# Patient Record
Sex: FEMALE | HISPANIC OR LATINO | Employment: FULL TIME | URBAN - METROPOLITAN AREA
[De-identification: names, ages, dates, MRNs, and addresses within clinical notes are randomized per-mention and may not be internally consistent; named-entity substitution may affect disease eponyms.]

---
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
## 2021-01-26 ENCOUNTER — HOSPITAL ENCOUNTER (INPATIENT)
Facility: MEDICAL CENTER | Age: 21
LOS: 2 days | DRG: 091 | End: 2021-01-28
Attending: EMERGENCY MEDICINE | Admitting: HOSPITALIST
Payer: COMMERCIAL

## 2021-01-26 PROBLEM — T50.902A INTENTIONAL DRUG OVERDOSE (HCC): Status: ACTIVE | Noted: 2021-01-26

## 2021-01-26 PROBLEM — R65.10 SIRS (SYSTEMIC INFLAMMATORY RESPONSE SYNDROME) (HCC): Status: ACTIVE | Noted: 2021-01-26

## 2021-01-26 PROBLEM — R94.31 QT PROLONGATION: Status: ACTIVE | Noted: 2021-01-26

## 2021-01-26 PROBLEM — E87.6 HYPOKALEMIA: Status: ACTIVE | Noted: 2021-01-26

## 2021-01-26 PROBLEM — G93.40 ENCEPHALOPATHY ACUTE: Status: ACTIVE | Noted: 2021-01-26

## 2021-01-26 LAB
ALBUMIN SERPL BCP-MCNC: 4.3 G/DL (ref 3.2–4.9)
ALBUMIN/GLOB SERPL: 1.6 G/DL
ALP SERPL-CCNC: 88 U/L (ref 30–99)
ALT SERPL-CCNC: 12 U/L (ref 2–50)
AMPHET UR QL SCN: NEGATIVE
ANION GAP SERPL CALC-SCNC: 19 MMOL/L (ref 7–16)
APAP SERPL-MCNC: <5 UG/ML (ref 10–30)
APPEARANCE UR: ABNORMAL
AST SERPL-CCNC: 19 U/L (ref 12–45)
BACTERIA #/AREA URNS HPF: NEGATIVE /HPF
BARBITURATES UR QL SCN: NEGATIVE
BASOPHILS # BLD AUTO: 0.3 % (ref 0–1.8)
BASOPHILS # BLD: 0.06 K/UL (ref 0–0.12)
BENZODIAZ UR QL SCN: POSITIVE
BILIRUB SERPL-MCNC: 0.2 MG/DL (ref 0.1–1.5)
BILIRUB UR QL STRIP.AUTO: NEGATIVE
BUN SERPL-MCNC: 10 MG/DL (ref 8–22)
BZE UR QL SCN: NEGATIVE
CALCIUM SERPL-MCNC: 8.9 MG/DL (ref 8.5–10.5)
CANNABINOIDS UR QL SCN: NEGATIVE
CHLORIDE SERPL-SCNC: 101 MMOL/L (ref 96–112)
CK SERPL-CCNC: 980 U/L (ref 0–154)
CO2 SERPL-SCNC: 17 MMOL/L (ref 20–33)
COLOR UR: YELLOW
CREAT SERPL-MCNC: 0.95 MG/DL (ref 0.5–1.4)
EKG IMPRESSION: NORMAL
EKG IMPRESSION: NORMAL
EOSINOPHIL # BLD AUTO: 0.01 K/UL (ref 0–0.51)
EOSINOPHIL NFR BLD: 0.1 % (ref 0–6.9)
EPI CELLS #/AREA URNS HPF: NEGATIVE /HPF
ERYTHROCYTE [DISTWIDTH] IN BLOOD BY AUTOMATED COUNT: 42.5 FL (ref 35.9–50)
ETHANOL BLD-MCNC: <10.1 MG/DL (ref 0–10)
GLOBULIN SER CALC-MCNC: 2.7 G/DL (ref 1.9–3.5)
GLUCOSE SERPL-MCNC: 182 MG/DL (ref 65–99)
GLUCOSE UR STRIP.AUTO-MCNC: NEGATIVE MG/DL
HCG SERPL QL: NEGATIVE
HCT VFR BLD AUTO: 39.7 % (ref 37–47)
HGB BLD-MCNC: 13.1 G/DL (ref 12–16)
HYALINE CASTS #/AREA URNS LPF: NORMAL /LPF
IMM GRANULOCYTES # BLD AUTO: 0.17 K/UL (ref 0–0.11)
IMM GRANULOCYTES NFR BLD AUTO: 0.9 % (ref 0–0.9)
KETONES UR STRIP.AUTO-MCNC: ABNORMAL MG/DL
LEUKOCYTE ESTERASE UR QL STRIP.AUTO: NEGATIVE
LYMPHOCYTES # BLD AUTO: 1.14 K/UL (ref 1–4.8)
LYMPHOCYTES NFR BLD: 5.8 % (ref 22–41)
MAGNESIUM SERPL-MCNC: 1.8 MG/DL (ref 1.5–2.5)
MCH RBC QN AUTO: 30.2 PG (ref 27–33)
MCHC RBC AUTO-ENTMCNC: 33 G/DL (ref 33.6–35)
MCV RBC AUTO: 91.5 FL (ref 81.4–97.8)
METHADONE UR QL SCN: NEGATIVE
MICRO URNS: ABNORMAL
MONOCYTES # BLD AUTO: 0.71 K/UL (ref 0–0.85)
MONOCYTES NFR BLD AUTO: 3.6 % (ref 0–13.4)
NEUTROPHILS # BLD AUTO: 17.59 K/UL (ref 2–7.15)
NEUTROPHILS NFR BLD: 89.3 % (ref 44–72)
NITRITE UR QL STRIP.AUTO: NEGATIVE
NRBC # BLD AUTO: 0 K/UL
NRBC BLD-RTO: 0 /100 WBC
OPIATES UR QL SCN: NEGATIVE
OXYCODONE UR QL SCN: NEGATIVE
PCP UR QL SCN: NEGATIVE
PH UR STRIP.AUTO: 5 [PH] (ref 5–8)
PLATELET # BLD AUTO: 217 K/UL (ref 164–446)
PMV BLD AUTO: 10.6 FL (ref 9–12.9)
POTASSIUM SERPL-SCNC: 3.1 MMOL/L (ref 3.6–5.5)
POTASSIUM SERPL-SCNC: 3.2 MMOL/L (ref 3.6–5.5)
PROPOXYPH UR QL SCN: NEGATIVE
PROT SERPL-MCNC: 7 G/DL (ref 6–8.2)
PROT UR QL STRIP: NEGATIVE MG/DL
RBC # BLD AUTO: 4.34 M/UL (ref 4.2–5.4)
RBC # URNS HPF: NORMAL /HPF
RBC UR QL AUTO: NEGATIVE
SALICYLATES SERPL-MCNC: <1 MG/DL (ref 15–25)
SARS-COV-2 RNA RESP QL NAA+PROBE: NOTDETECTED
SODIUM SERPL-SCNC: 137 MMOL/L (ref 135–145)
SP GR UR STRIP.AUTO: 1.02
SPECIMEN SOURCE: NORMAL
UROBILINOGEN UR STRIP.AUTO-MCNC: 0.2 MG/DL
WBC # BLD AUTO: 19.7 K/UL (ref 4.8–10.8)
WBC #/AREA URNS HPF: NORMAL /HPF

## 2021-01-26 PROCEDURE — 93005 ELECTROCARDIOGRAM TRACING: CPT | Performed by: EMERGENCY MEDICINE

## 2021-01-26 PROCEDURE — 99223 1ST HOSP IP/OBS HIGH 75: CPT | Performed by: PSYCHIATRY & NEUROLOGY

## 2021-01-26 PROCEDURE — 80179 DRUG ASSAY SALICYLATE: CPT

## 2021-01-26 PROCEDURE — 770022 HCHG ROOM/CARE - ICU (200)

## 2021-01-26 PROCEDURE — 700105 HCHG RX REV CODE 258: Performed by: EMERGENCY MEDICINE

## 2021-01-26 PROCEDURE — 84132 ASSAY OF SERUM POTASSIUM: CPT

## 2021-01-26 PROCEDURE — 700111 HCHG RX REV CODE 636 W/ 250 OVERRIDE (IP): Performed by: HOSPITALIST

## 2021-01-26 PROCEDURE — U0005 INFEC AGEN DETEC AMPLI PROBE: HCPCS

## 2021-01-26 PROCEDURE — 94760 N-INVAS EAR/PLS OXIMETRY 1: CPT

## 2021-01-26 PROCEDURE — 99291 CRITICAL CARE FIRST HOUR: CPT

## 2021-01-26 PROCEDURE — 93005 ELECTROCARDIOGRAM TRACING: CPT | Performed by: HOSPITALIST

## 2021-01-26 PROCEDURE — 93005 ELECTROCARDIOGRAM TRACING: CPT

## 2021-01-26 PROCEDURE — U0003 INFECTIOUS AGENT DETECTION BY NUCLEIC ACID (DNA OR RNA); SEVERE ACUTE RESPIRATORY SYNDROME CORONAVIRUS 2 (SARS-COV-2) (CORONAVIRUS DISEASE [COVID-19]), AMPLIFIED PROBE TECHNIQUE, MAKING USE OF HIGH THROUGHPUT TECHNOLOGIES AS DESCRIBED BY CMS-2020-01-R: HCPCS

## 2021-01-26 PROCEDURE — 81001 URINALYSIS AUTO W/SCOPE: CPT

## 2021-01-26 PROCEDURE — 82077 ASSAY SPEC XCP UR&BREATH IA: CPT

## 2021-01-26 PROCEDURE — 84703 CHORIONIC GONADOTROPIN ASSAY: CPT

## 2021-01-26 PROCEDURE — 99291 CRITICAL CARE FIRST HOUR: CPT | Performed by: INTERNAL MEDICINE

## 2021-01-26 PROCEDURE — 36415 COLL VENOUS BLD VENIPUNCTURE: CPT

## 2021-01-26 PROCEDURE — 700105 HCHG RX REV CODE 258: Performed by: HOSPITALIST

## 2021-01-26 PROCEDURE — 700111 HCHG RX REV CODE 636 W/ 250 OVERRIDE (IP): Performed by: PSYCHIATRY & NEUROLOGY

## 2021-01-26 PROCEDURE — 85025 COMPLETE CBC W/AUTO DIFF WBC: CPT

## 2021-01-26 PROCEDURE — 83735 ASSAY OF MAGNESIUM: CPT

## 2021-01-26 PROCEDURE — 80143 DRUG ASSAY ACETAMINOPHEN: CPT

## 2021-01-26 PROCEDURE — 700101 HCHG RX REV CODE 250: Performed by: HOSPITALIST

## 2021-01-26 PROCEDURE — 80307 DRUG TEST PRSMV CHEM ANLYZR: CPT

## 2021-01-26 PROCEDURE — 80053 COMPREHEN METABOLIC PANEL: CPT

## 2021-01-26 PROCEDURE — 82550 ASSAY OF CK (CPK): CPT

## 2021-01-26 PROCEDURE — 93010 ELECTROCARDIOGRAM REPORT: CPT | Performed by: INTERNAL MEDICINE

## 2021-01-26 RX ORDER — FLUOXETINE HYDROCHLORIDE 20 MG/1
40 CAPSULE ORAL DAILY
Status: ON HOLD | COMMUNITY
End: 2021-01-28

## 2021-01-26 RX ORDER — PROCHLORPERAZINE EDISYLATE 5 MG/ML
10 INJECTION INTRAMUSCULAR; INTRAVENOUS EVERY 6 HOURS PRN
Status: DISCONTINUED | OUTPATIENT
Start: 2021-01-26 | End: 2021-01-26

## 2021-01-26 RX ORDER — PROCHLORPERAZINE EDISYLATE 5 MG/ML
10 INJECTION INTRAMUSCULAR; INTRAVENOUS EVERY 6 HOURS PRN
Status: DISCONTINUED | OUTPATIENT
Start: 2021-01-26 | End: 2021-01-28 | Stop reason: HOSPADM

## 2021-01-26 RX ORDER — SODIUM CHLORIDE 9 MG/ML
1000 INJECTION, SOLUTION INTRAVENOUS ONCE
Status: COMPLETED | OUTPATIENT
Start: 2021-01-26 | End: 2021-01-26

## 2021-01-26 RX ORDER — POTASSIUM CHLORIDE 20 MEQ/1
40 TABLET, EXTENDED RELEASE ORAL ONCE
Status: COMPLETED | OUTPATIENT
Start: 2021-01-26 | End: 2021-01-27

## 2021-01-26 RX ORDER — DEXMEDETOMIDINE HYDROCHLORIDE 4 UG/ML
.1-1.5 INJECTION, SOLUTION INTRAVENOUS CONTINUOUS
Status: DISCONTINUED | OUTPATIENT
Start: 2021-01-26 | End: 2021-01-27

## 2021-01-26 RX ORDER — LORAZEPAM 2 MG/ML
1-2 INJECTION INTRAMUSCULAR
Status: DISCONTINUED | OUTPATIENT
Start: 2021-01-26 | End: 2021-01-28 | Stop reason: HOSPADM

## 2021-01-26 RX ORDER — AMOXICILLIN 250 MG
2 CAPSULE ORAL 2 TIMES DAILY
Status: DISCONTINUED | OUTPATIENT
Start: 2021-01-26 | End: 2021-01-28 | Stop reason: HOSPADM

## 2021-01-26 RX ORDER — BISACODYL 10 MG
10 SUPPOSITORY, RECTAL RECTAL
Status: DISCONTINUED | OUTPATIENT
Start: 2021-01-26 | End: 2021-01-28 | Stop reason: HOSPADM

## 2021-01-26 RX ORDER — POLYETHYLENE GLYCOL 3350 17 G/17G
1 POWDER, FOR SOLUTION ORAL
Status: DISCONTINUED | OUTPATIENT
Start: 2021-01-26 | End: 2021-01-28 | Stop reason: HOSPADM

## 2021-01-26 RX ORDER — LISDEXAMFETAMINE DIMESYLATE 30 MG/1
30 CAPSULE ORAL EVERY MORNING
Status: ON HOLD | COMMUNITY
End: 2021-01-28

## 2021-01-26 RX ADMIN — POTASSIUM CHLORIDE: 149 INJECTION, SOLUTION, CONCENTRATE INTRAVENOUS at 23:03

## 2021-01-26 RX ADMIN — DEXMEDETOMIDINE HYDROCHLORIDE 0.2 MCG/KG/HR: 100 INJECTION, SOLUTION INTRAVENOUS at 16:05

## 2021-01-26 RX ADMIN — SODIUM CHLORIDE 1000 ML: 9 INJECTION, SOLUTION INTRAVENOUS at 11:14

## 2021-01-26 RX ADMIN — POTASSIUM CHLORIDE: 149 INJECTION, SOLUTION, CONCENTRATE INTRAVENOUS at 16:13

## 2021-01-26 RX ADMIN — PROCHLORPERAZINE EDISYLATE 10 MG: 5 INJECTION INTRAMUSCULAR; INTRAVENOUS at 22:59

## 2021-01-26 ASSESSMENT — COGNITIVE AND FUNCTIONAL STATUS - GENERAL
TOILETING: TOTAL
PERSONAL GROOMING: TOTAL
MOVING FROM LYING ON BACK TO SITTING ON SIDE OF FLAT BED: UNABLE
MOBILITY SCORE: 9
DRESSING REGULAR UPPER BODY CLOTHING: TOTAL
STANDING UP FROM CHAIR USING ARMS: TOTAL
SUGGESTED CMS G CODE MODIFIER DAILY ACTIVITY: CN
EATING MEALS: TOTAL
WALKING IN HOSPITAL ROOM: TOTAL
SUGGESTED CMS G CODE MODIFIER MOBILITY: CM
HELP NEEDED FOR BATHING: TOTAL
DAILY ACTIVITIY SCORE: 6
CLIMB 3 TO 5 STEPS WITH RAILING: TOTAL
MOVING TO AND FROM BED TO CHAIR: UNABLE
DRESSING REGULAR LOWER BODY CLOTHING: TOTAL

## 2021-01-26 ASSESSMENT — LIFESTYLE VARIABLES
DOES PATIENT WANT TO STOP DRINKING: CANNOT ASSESS
REASON UNABLE TO ASSESS: AMS

## 2021-01-26 ASSESSMENT — FIBROSIS 4 INDEX: FIB4 SCORE: 0.48

## 2021-01-26 NOTE — DISCHARGE PLANNING
Pt mother arrived to ED to see Pt.   Pt currently being transferred to [facility] ICU  SW updated mother about Pt Legal Hold status and no visitors are allowed until Physician or Psychiatry allow Pt to have visitors.     SW asked admitting RN transferring Pt to Unit to please contact the family with a medical update.

## 2021-01-26 NOTE — ED NOTES
Poison control case #[id number], Lanie pharmacist: Consider medical clearance after 4 hours if labs within normal limits.    Estimated overdose on Seroquel 800 mg, fluoxetine 350 mg.    Recommend following labs:  CMP  CBC  Urine drug screen   HCG  Tylenol  Salyctalyte    Patient arousable.  Blood sent to lab.
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
## 2021-01-26 NOTE — ASSESSMENT & PLAN NOTE
Purposeful overdose on Quetiapine and Fluoxetine with contribution from recent life stressors  Legal hold placed and extended  Psychiatry onboard, will re-evaluate  Medically cleared for transfer to psychiatric facility  Continue 1:1 sitter  Hold psychiatric medications
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
## 2021-01-26 NOTE — ED NOTES
Patient high risk for suicide screening, patient in direct observation of sitter at all times.  Necessary medical equipment remains at patient bedside.  Patient confused and pulling at lines, bilateral wrist restraints applied, ERP made aware.      Patient alert to verbal stimuli - QUINN, opens eyes, not following commands at this time.

## 2021-01-26 NOTE — ED NOTES
Bedside report to ALEXA Dawn.  Phylicia Sammy transported to [facility] via gurney with RN and tech, patient on monitor. All personal belongings in possession.  NAD noted.

## 2021-01-26 NOTE — ASSESSMENT & PLAN NOTE
SIRS criteria identified on my evaluation include:  Tachycardia, with heart rate greater than 90 BPM  SIRS is non-infectious, the patient does not have sepsis  WBC is 19 and she has tachycardia  Resolved

## 2021-01-26 NOTE — ED TRIAGE NOTES
Chief Complaint   Patient presents with   • Suicidal Ideation   • Drug Overdose     estimated seroquel 800 mg and fluoxetine 350 mg   • Laceration     bilateral wrist - superficial wounds, no bleeding.    • ALOC     Patient bib EMS from home.  Reported SI attempted.  Per EMS, patient texted friend she was going to overdose on home medications at 0600.  Patient LKW last night.  Patient combative on scene, PTA:  PIV placed    Versed 4 mg administered   ml  Per EMS, patient had 1 episode of emesis on scene, no pills noted.      Per EMS, estimated Seroquel 800 mg and fluoxetine 350 mg estimated on overdose if patient taking prescription as prescribed.      Patient combative during move to [facility], puling at lines, does not answer questions.  Medical restraints applied. Respirations even and unlabored.    Chart up for ERP.

## 2021-01-26 NOTE — DISCHARGE PLANNING
Alert Team  Consult order noted for SA.  Legal sobriety needs to be confirmed first.  RN to have PAR complete pt's registration.  Push fluids til able to supply sample for UDS.  WCTM.

## 2021-01-26 NOTE — ED PROVIDER NOTES
ED Provider Note    Scribed for Jonathan Schwartz M.D. by Kasey Dinero. 1/26/2021, 10:21 AM.    Primary care provider: Pcp Pt States None  Means of arrival: EMS  History obtained from: Nurse   History limited by: ALOC    CHIEF COMPLAINT  Chief Complaint   Patient presents with   • Suicidal Ideation   • Drug Overdose     estimated seroquel 800 mg and fluoxetine 350 mg   • Laceration     bilateral wrist - superficial wounds, no bleeding.    • ALOC       HPI  Phylicia Christianson is a 20 y.o. female who presents to the Emergency Department via EMS for a drug overdose at around 6 AM. Per the nurse, the patient texted her friend that she was going to overdose on her home medications; she took 16 tabs of seroquel (800 mg) and 350 mg fluoxeteine. When she was found in her room by her brother, she had superficial wounds to both wrists. EMS states that the patient had 1 episode of vomiting when they arrived with no pills visualize in the vomit. No other exacerbating or alleviating factors were noted.  In route, she was given 4 mg Versed and  mL.     HPI limited secondary to the patient's ALOC    REVIEW OF SYSTEMS  Pertinent positives include drug overdose, suicidal ideation, lacerations to both wrist, ALOC.    ROS limited secondary to the patient's ALOC    PAST MEDICAL HISTORY   has a past medical history of ADD (attention deficit disorder), Anxiety, ASTHMA, Depression, and Psychiatric disorder.    SURGICAL HISTORY  patient denies any surgical history    SOCIAL HISTORY  Social History     Tobacco Use   • Smoking status: Never Smoker   • Smokeless tobacco: Never Used   Substance Use Topics   • Alcohol use: No   • Drug use: No      Social History     Substance and Sexual Activity   Drug Use No       FAMILY HISTORY  None pertinent    CURRENT MEDICATIONS  Current Outpatient Medications   Medication Instructions   • albuterol 108 (90 Base) MCG/ACT Aero Soln inhalation aerosol 2 Puffs, Inhalation, EVERY 6 HOURS PRN   • BUSPIRONE HCL  "PO Oral   • lamoTRIgine (LAMICTAL) 100 mg, Oral, DAILY   • methylphenidate (RITALIN) 10 mg, Oral, 2 TIMES DAILY   • QUEtiapine (SEROQUEL) 50 mg, Oral, 2 TIMES DAILY     ALLERGIES  Allergies   Allergen Reactions   • Cat Hair Extract        PHYSICAL EXAM  VITAL SIGNS: /65   Pulse (!) 106   Temp 36.6 °C (97.8 °F) (Temporal)   Resp (!) 22   Ht 1.6 m (5' 3\")   Wt 72.6 kg (160 lb)   SpO2 97%   BMI 28.34 kg/m²     Constitutional: Well developed, Well nourished, No acute distress, Non-toxic appearance.   HENT: Normocephalic, Atraumatic, mucous membranes moist, no erythema, exudates, swelling, or masses, nares patent  Eyes: nonicteric  Neck: Supple, no meningismus  Lymphatic: No lymphadenopathy noted.   Cardiovascular: Regular rate and rhythm, no gallops rubs or murmurs  Lungs: Clear bilaterally   Abdomen: Bowel sounds normal, Soft, No tenderness  Skin: Warm, Dry, no rash  Genitalia: Deferred  Rectal: Deferred  Extremities: No edema, superficial abrasions to wrists bilaterally  Neurologic: Moving all extremities, no evidence of head trauma, responds to painful stimuli, protecting air way, disconjugate gaze. GCS 9  Psychiatric: Suicidal ideation noted. Does not answer questions appropriately.     DIAGNOSTIC STUDIES / PROCEDURES    LABS  Results for orders placed or performed during the hospital encounter of 01/26/21   CBC WITH DIFFERENTIAL   Result Value Ref Range    WBC 19.7 (H) 4.8 - 10.8 K/uL    RBC 4.34 4.20 - 5.40 M/uL    Hemoglobin 13.1 12.0 - 16.0 g/dL    Hematocrit 39.7 37.0 - 47.0 %    MCV 91.5 81.4 - 97.8 fL    MCH 30.2 27.0 - 33.0 pg    MCHC 33.0 (L) 33.6 - 35.0 g/dL    RDW 42.5 35.9 - 50.0 fL    Platelet Count 217 164 - 446 K/uL    MPV 10.6 9.0 - 12.9 fL    Neutrophils-Polys 89.30 (H) 44.00 - 72.00 %    Lymphocytes 5.80 (L) 22.00 - 41.00 %    Monocytes 3.60 0.00 - 13.40 %    Eosinophils 0.10 0.00 - 6.90 %    Basophils 0.30 0.00 - 1.80 %    Immature Granulocytes 0.90 0.00 - 0.90 %    Nucleated RBC 0.00 " /100 WBC    Neutrophils (Absolute) 17.59 (H) 2.00 - 7.15 K/uL    Lymphs (Absolute) 1.14 1.00 - 4.80 K/uL    Monos (Absolute) 0.71 0.00 - 0.85 K/uL    Eos (Absolute) 0.01 0.00 - 0.51 K/uL    Baso (Absolute) 0.06 0.00 - 0.12 K/uL    Immature Granulocytes (abs) 0.17 (H) 0.00 - 0.11 K/uL    NRBC (Absolute) 0.00 K/uL   Comp Metabolic Panel   Result Value Ref Range    Sodium 137 135 - 145 mmol/L    Potassium 3.1 (L) 3.6 - 5.5 mmol/L    Chloride 101 96 - 112 mmol/L    Co2 17 (L) 20 - 33 mmol/L    Anion Gap 19.0 (H) 7.0 - 16.0    Glucose 182 (H) 65 - 99 mg/dL    Bun 10 8 - 22 mg/dL    Creatinine 0.95 0.50 - 1.40 mg/dL    Calcium 8.9 8.5 - 10.5 mg/dL    AST(SGOT) 19 12 - 45 U/L    ALT(SGPT) 12 2 - 50 U/L    Alkaline Phosphatase 88 30 - 99 U/L    Total Bilirubin 0.2 0.1 - 1.5 mg/dL    Albumin 4.3 3.2 - 4.9 g/dL    Total Protein 7.0 6.0 - 8.2 g/dL    Globulin 2.7 1.9 - 3.5 g/dL    A-G Ratio 1.6 g/dL   HCG QUAL SERUM   Result Value Ref Range    Beta-Hcg Qualitative Serum Negative Negative   ESTIMATED GFR   Result Value Ref Range    GFR If African American >60 >60 mL/min/1.73 m 2    GFR If Non African American >60 >60 mL/min/1.73 m 2   SARS-CoV-2 PCR (24 hour In-House): Collect NP swab in [facility]    Specimen: Respirate   Result Value Ref Range    SARS-CoV-2 Source NP Swab     SARS-CoV-2 by PCR NotDetected    ACETAMINOPHEN   Result Value Ref Range    Acetaminophen -Tylenol <5 (L) 10 - 30 ug/mL   Salicylate   Result Value Ref Range    Salicylates, Quant. <1 (L) 15 - 25 mg/dL   URINALYSIS (UA)    Specimen: Urine   Result Value Ref Range    Color Yellow     Character Cloudy (A)     Specific Gravity 1.022 <1.035    Ph 5.0 5.0 - 8.0    Glucose Negative Negative mg/dL    Ketones Trace (A) Negative mg/dL    Protein Negative Negative mg/dL    Bilirubin Negative Negative    Urobilinogen, Urine 0.2 Negative    Nitrite Negative Negative    Leukocyte Esterase Negative Negative    Occult Blood Negative Negative    Micro Urine Req Microscopic     DIAGNOSTIC ALCOHOL   Result Value Ref Range    Diagnostic Alcohol <10.1 0.0 - 10.0 mg/dL   URINE DRUG SCREEN   Result Value Ref Range    Amphetamines Urine Negative Negative    Barbiturates Negative Negative    Benzodiazepines Positive (A) Negative    Cocaine Metabolite Negative Negative    Methadone Negative Negative    Opiates Negative Negative    Oxycodone Negative Negative    Phencyclidine -Pcp Negative Negative    Propoxyphene Negative Negative    Cannabinoid Metab Negative Negative   MAGNESIUM   Result Value Ref Range    Magnesium 1.8 1.5 - 2.5 mg/dL   URINE MICROSCOPIC (W/UA)   Result Value Ref Range    WBC 0-2 /hpf    RBC 0-2 /hpf    Bacteria Negative None /hpf    Epithelial Cells Negative /hpf    Hyaline Cast 0-2 /lpf   CREATINE KINASE   Result Value Ref Range    CPK Total 980 (H) 0 - 154 U/L   POTASSIUM SERUM (K)   Result Value Ref Range    Potassium 3.2 (L) 3.6 - 5.5 mmol/L   CBC WITH DIFFERENTIAL   Result Value Ref Range    WBC 9.5 4.8 - 10.8 K/uL    RBC 3.07 (L) 4.20 - 5.40 M/uL    Hemoglobin 9.3 (L) 12.0 - 16.0 g/dL    Hematocrit 29.1 (L) 37.0 - 47.0 %    MCV 93.2 81.4 - 97.8 fL    MCH 30.3 27.0 - 33.0 pg    MCHC 32.5 (L) 33.6 - 35.0 g/dL    RDW 44.9 35.9 - 50.0 fL    Platelet Count 183 164 - 446 K/uL    MPV 10.3 9.0 - 12.9 fL    Neutrophils-Polys 77.70 (H) 44.00 - 72.00 %    Lymphocytes 17.80 (L) 22.00 - 41.00 %    Monocytes 4.00 0.00 - 13.40 %    Eosinophils 0.00 0.00 - 6.90 %    Basophils 0.20 0.00 - 1.80 %    Immature Granulocytes 0.30 0.00 - 0.90 %    Nucleated RBC 0.00 /100 WBC    Neutrophils (Absolute) 7.36 (H) 2.00 - 7.15 K/uL    Lymphs (Absolute) 1.69 1.00 - 4.80 K/uL    Monos (Absolute) 0.38 0.00 - 0.85 K/uL    Eos (Absolute) 0.00 0.00 - 0.51 K/uL    Baso (Absolute) 0.02 0.00 - 0.12 K/uL    Immature Granulocytes (abs) 0.03 0.00 - 0.11 K/uL    NRBC (Absolute) 0.00 K/uL   Comp Metabolic Panel   Result Value Ref Range    Sodium 137 135 - 145 mmol/L    Potassium 4.1 3.6 - 5.5 mmol/L    Chloride  104 96 - 112 mmol/L    Co2 24 20 - 33 mmol/L    Anion Gap 9.0 7.0 - 16.0    Glucose 87 65 - 99 mg/dL    Bun 5 (L) 8 - 22 mg/dL    Creatinine 0.71 0.50 - 1.40 mg/dL    Calcium 9.3 8.5 - 10.5 mg/dL    AST(SGOT) 40 12 - 45 U/L    ALT(SGPT) 16 2 - 50 U/L    Alkaline Phosphatase 95 30 - 99 U/L    Total Bilirubin 0.4 0.1 - 1.5 mg/dL    Albumin 4.2 3.2 - 4.9 g/dL    Total Protein 6.9 6.0 - 8.2 g/dL    Globulin 2.7 1.9 - 3.5 g/dL    A-G Ratio 1.6 g/dL   MAGNESIUM   Result Value Ref Range    Magnesium 2.0 1.5 - 2.5 mg/dL   PHOSPHORUS   Result Value Ref Range    Phosphorus 3.0 2.5 - 4.5 mg/dL   ESTIMATED GFR   Result Value Ref Range    GFR If African American >60 >60 mL/min/1.73 m 2    GFR If Non African American >60 >60 mL/min/1.73 m 2   CBC WITH DIFFERENTIAL   Result Value Ref Range    WBC 6.1 4.8 - 10.8 K/uL    RBC 4.85 4.20 - 5.40 M/uL    Hemoglobin 14.5 12.0 - 16.0 g/dL    Hematocrit 44.0 37.0 - 47.0 %    MCV 90.7 81.4 - 97.8 fL    MCH 29.9 27.0 - 33.0 pg    MCHC 33.0 (L) 33.6 - 35.0 g/dL    RDW 42.9 35.9 - 50.0 fL    Platelet Count 274 164 - 446 K/uL    MPV 10.1 9.0 - 12.9 fL    Neutrophils-Polys 57.50 44.00 - 72.00 %    Lymphocytes 35.30 22.00 - 41.00 %    Monocytes 5.40 0.00 - 13.40 %    Eosinophils 1.20 0.00 - 6.90 %    Basophils 0.30 0.00 - 1.80 %    Immature Granulocytes 0.30 0.00 - 0.90 %    Nucleated RBC 0.00 /100 WBC    Neutrophils (Absolute) 3.48 2.00 - 7.15 K/uL    Lymphs (Absolute) 2.14 1.00 - 4.80 K/uL    Monos (Absolute) 0.33 0.00 - 0.85 K/uL    Eos (Absolute) 0.07 0.00 - 0.51 K/uL    Baso (Absolute) 0.02 0.00 - 0.12 K/uL    Immature Granulocytes (abs) 0.02 0.00 - 0.11 K/uL    NRBC (Absolute) 0.00 K/uL   Comp Metabolic Panel   Result Value Ref Range    Sodium 136 135 - 145 mmol/L    Potassium 3.9 3.6 - 5.5 mmol/L    Chloride 102 96 - 112 mmol/L    Co2 24 20 - 33 mmol/L    Anion Gap 10.0 7.0 - 16.0    Glucose 82 65 - 99 mg/dL    Bun 9 8 - 22 mg/dL    Creatinine 0.80 0.50 - 1.40 mg/dL    Calcium 9.8 8.5 -  10.5 mg/dL    AST(SGOT) 46 (H) 12 - 45 U/L    ALT(SGPT) 22 2 - 50 U/L    Alkaline Phosphatase 99 30 - 99 U/L    Total Bilirubin 0.5 0.1 - 1.5 mg/dL    Albumin 4.6 3.2 - 4.9 g/dL    Total Protein 7.8 6.0 - 8.2 g/dL    Globulin 3.2 1.9 - 3.5 g/dL    A-G Ratio 1.4 g/dL   MAGNESIUM   Result Value Ref Range    Magnesium 2.1 1.5 - 2.5 mg/dL   PHOSPHORUS   Result Value Ref Range    Phosphorus 3.1 2.5 - 4.5 mg/dL   ESTIMATED GFR   Result Value Ref Range    GFR If African American >60 >60 mL/min/1.73 m 2    GFR If Non African American >60 >60 mL/min/1.73 m 2   EKG   Result Value Ref Range    Report       [facility] Emergency Dept.    Test Date:  2021  Pt Name:    MYCHAL EUGENE             Department: ER  MRN:        [id number]                      Room:       RD 04  Gender:     Female                       Technician: [id number]  :        2000                   Requested By:ER TRIAGE PROTOCOL  Order #:    [id number]                    Reading MD:    Measurements  Intervals                                Axis  Rate:       105                          P:          62  AR:         160                          QRS:        31  QRSD:       86                           T:          9  QT:         372  QTc:        492    Interpretive Statements  SINUS TACHYCARDIA  BORDERLINE PROLONGED QT INTERVAL  No previous ECG available for comparison     EKG (NOW)   Result Value Ref Range    Report       [facility]    Test Date:  2021  Pt Name:    MYCHAL EUGENE             Department: ER  MRN:        [id number]                      Room:       14  Gender:     Female                       Technician: PEP  :        2000                   Requested By:RUSTY BEAR  Order #:    [id number]                    Reading MD: Alonso Quiñonez MD    Measurements  Intervals                                Axis  Rate:       109                          P:          69  AR:         192                           QRS:        45  QRSD:       90                           T:          41  QT:         352  QTc:        475    Interpretive Statements  SINUS TACHYCARDIA  BASELINE WANDER IN LEAD(S) V6  Compared to ECG 2021 09:56:15  No significant changes  Electronically Signed On 2021 17:08:09 PST by Alonso Quiñonez MD     EKG   Result Value Ref Range    Report       [facility]    Test Date:  2021  Pt Name:    MYCHAL EUGENE             Department: [facility]  MRN:        [id number]                      Room:       R114  Gender:     Female                       Technician: [facility]  :        2000                   Requested By:GRICELDA ALMANZAR  Order #:    [id number]                    Reading MD: Thomas Miles MD    Measurements  Intervals                                Axis  Rate:       72                           P:          64  MS:         172                          QRS:        35  QRSD:       84                           T:          21  QT:         400  QTc:        438    Interpretive Statements  SINUS RHYTHM  Compared to ECG 2021 16:16:04  Sinus tachycardia no longer present  Electronically Signed On 2021 14:40:15 PST by Thomas Miles MD       All labs reviewed by me.    EKG  Obtained at 9:56 AM  Sinus tachycardia   Rate 105  Axis normal   Prolonged QT interval  No ST segment elevation or depression.     COURSE & MEDICAL DECISION MAKING  Nursing notes, VS, PMSFHx reviewed in chart.     PPE Note: I personally donned full PPE for all patient encounters during this visit, including wearing an N95 respirator mask, gloves, and eye protection. Scribe remained outside the patient's room and did not have any contact with the patient for the duration of patient encounter.      10:21 AM Patient seen and examined at bedside. Poison control contacted by nurse (case #[id number]); recommended CMP, CBC, urine drug screen, HCG, acetaminophen, and salicylate, with medical clearance in 4 hours if labs  are normal.  Ordered for Diagnostic alcohol, Urine drug screen, CBC w/diff, CMP, HCG qual serum, Acetaminophen, Salicylate, and an EKG to evaluate. Legal hold in place.    11:04 AM Patient will be treated with NS infusion 1000 mL    11:08 AM Recheck: Patient re-evaluated at bedside. Patient is resting comfortably    11:19 AM Patient was placed in medical restraints at this time as nurse notes that patient keeps pulling at lines.     12:37 AM Ordered a UA to evaluate the patient's symptoms further    12:54 PM Recheck: Patient re-evaluated at beside. Patient is resting comfortably.     HYDRATION: Based on the patient's presentation of Tachycardia the patient was given IV fluids. IV Hydration was used because oral hydration was not adequate alone. Upon recheck following hydration, the patient was improved.      Decision Making:  This is a 20 y.o. year old female who presents status post intentional overdose with Seroquel and fluoxetine.  The patient is altered and has remained altered throughout her stay.  She does arouse primarily to painful stimuli.  She is essentially nonverbal at this point and unable to follow commands.  Tylenol and aspirin are negative.  Her vitals have remained relatively stable.  Her EKG demonstrates no QRS widening.  Electrolytes are otherwise reassuring.  Given the patient's change in mental status she will need more prolonged observation then can be accomplished in the emergency room and she will be admitted for intentional overdose.    CRITICAL CARE  The very real possibilty of a deterioration of this patient's condition required the highest level of my preparedness for sudden, emergent intervention.  I provided critical care services, which included medication orders, frequent reevaluations of the patient's condition and response to treatment, ordering and reviewing test results, and discussing the case with various consultants.  The critical care time associated with the care of the  patient was 30 minutes. Review chart for interventions. This time is exclusive of any other billable procedures.    FINAL IMPRESSION  Intentional overdose     Kasey PALAFOX (Scribe), am scribing for, and in the presence of, Jonathan Schwartz M.D..    Electronically signed by: Kasey Dinero (Scribe), 1/26/2021    Jonathan PALAFOX M.D. personally performed the services described in this documentation, as scribed by Kasey Dinero in my presence, and it is both accurate and complete.    C    The note accurately reflects work and decisions made by me.  Jonathan Schwartz M.D.  1/29/2021  9:34 AM

## 2021-01-26 NOTE — ED NOTES
Patient had episode of emesis, suction used; 2 person assist to cleanse patient and linen change - patient combative during cleanse. Repositioned and placed on side, suction remains at bedside.

## 2021-01-26 NOTE — H&P
Hospital Medicine History & Physical Note    Date of Service  1/26/2021    Primary Care Physician  Unknown      Consultants  Critical care. I discussed with Dr. Theodore  psychiatry    Code Status  Full Code    Chief Complaint  Chief Complaint   Patient presents with   • Suicidal Ideation   • Drug Overdose     estimated seroquel 800 mg and fluoxetine 350 mg   • Laceration     bilateral wrist - superficial wounds, no bleeding.    • ALOC       History of Presenting Illness  20 y.o. female who presented 1/26/2021 with overdose.  Ms. Christianson reportedly has a past medical history depression on fluoxetine and Seroquel that was brought in by paramedics because of a possible substance overdose.  He has multiple laceration marks on her wrist that are superficial apparently family called her she texted a friend that she was going to overdose at 6:00 this morning.  Paramedics had to give her 4 mg IV Versed.  Pill bottles are at bedside and poison control has been consulted.  Is estimated that she took about 800 mg of Seroquel and about 350 mg of fluoxetine.  Her QTC is 492 she will be admitted to the intensive care unit for continuous monitoring.  Legal hold has been placed in the emergency room.    Pill bottle are at bedside. Two bottles of Seroquel 2 mg taken daily are at bedside and they are empty.  1 bottle of fluoxetine 20 mg she is to take 2 daily bedside and nearly empty.  The provider name is Elis Walton that prescribed these medications.    The SARS-CoV-2 swab is pending at this time.  Due to her severe agitation, pulling at restraints, the setting of overdose she will be admitted to the intensive care unit with critical care consultation.  Psychiatry has been consulted from the emergency room though at this point time she is not a historian and they will hopefully see her tomorrow.    Review of Systems  Review of Systems   Unable to perform ROS: Mental status change       Past Medical History   has a past medical  history of ADD (attention deficit disorder), Anxiety, ASTHMA, Depression, and Psychiatric disorder.    Surgical History  This cannot be obtained as patient is nonverbal    Family History  This cannot be obtained as patient is nonverbal    Social History  This cannot be obtained as patient is nonverbal    Allergies  Allergies   Allergen Reactions   • Cat Hair Extract        Medications  Prior to Admission Medications   Prescriptions Last Dose Informant Patient Reported? Taking?   FLUoxetine (PROZAC) 20 MG Cap UNK at [facility] Patient's Home Pharmacy Yes Yes   Sig: Take 40 mg by mouth every day.   albuterol 108 (90 Base) MCG/ACT Aero Soln inhalation aerosol NOT TAKING at NOT TAKING Patient's Home Pharmacy No No   Sig: Inhale 2 Puffs by mouth every 6 hours as needed for Shortness of Breath.   lamoTRIgine (LAMICTAL) 100 MG Tab UNK at [facility] Patient's Home Pharmacy Yes No   Sig: Take 100 mg by mouth every day.   lisdexamfetamine (VYVANSE) 30 MG capsule UNK at [facility] Patient's Home Pharmacy Yes Yes   Sig: Take 30 mg by mouth every morning.   quetiapine (SEROQUEL) 50 MG tablet UNK at [facility] Patient's Home Pharmacy Yes No   Sig: Take 50 mg by mouth every bedtime.      Facility-Administered Medications: None       Physical Exam  Temp:  [36.6 °C (97.8 °F)] 36.6 °C (97.8 °F)  Pulse:  [] 131  Resp:  [15-22] 20  BP: (109-164)/() 164/101  SpO2:  [96 %-98 %] 97 %    Physical Exam  Vitals signs and nursing note reviewed.   Constitutional:       General: She is in acute distress.      Appearance: She is not toxic-appearing.   HENT:      Head: Normocephalic and atraumatic.      Mouth/Throat:      Mouth: Mucous membranes are dry.      Pharynx: Oropharynx is clear.   Eyes:      Comments: Pupils dilated    Neck:      Musculoskeletal: Normal range of motion and neck supple.   Cardiovascular:      Rate and Rhythm: Regular rhythm. Tachycardia present.      Heart sounds: No murmur.   Pulmonary:      Comments: Tachypnea   Clear breath  sounds  Abdominal:      General: There is no distension.      Tenderness: There is no abdominal tenderness.   Musculoskeletal:      Right lower leg: No edema.      Left lower leg: No edema.   Skin:     General: Skin is warm and dry.   Neurological:      Comments: She moves her extremities equally   Psychiatric:      Comments: Non-verbal and does not follow commands         Laboratory:  Recent Labs     01/26/21  1003   WBC 19.7*   RBC 4.34   HEMOGLOBIN 13.1   HEMATOCRIT 39.7   MCV 91.5   MCH 30.2   MCHC 33.0*   RDW 42.5   PLATELETCT 217   MPV 10.6     Recent Labs     01/26/21  1003   SODIUM 137   POTASSIUM 3.1*   CHLORIDE 101   CO2 17*   GLUCOSE 182*   BUN 10   CREATININE 0.95   CALCIUM 8.9     Recent Labs     01/26/21  1003   ALTSGPT 12   ASTSGOT 19   ALKPHOSPHAT 88   TBILIRUBIN 0.2   GLUCOSE 182*         No results for input(s): NTPROBNP in the last 72 hours.      No results for input(s): TROPONINT in the last 72 hours.    Imaging:  No orders to display         Assessment/Plan:  I anticipate this patient will require at least two midnights for appropriate medical management, necessitating inpatient admission.    * Intentional drug overdose (HCC)- (present on admission)  Assessment & Plan  Purposeful overdose on Quetiapine and Fluoxetine   Poison control consulted from the ER  Tyelenol and aspirin negative  Legal hold placed  Psychiatry consult for 1/27  Admit to ICU with critical care consultation    Encephalopathy acute- (present on admission)  Assessment & Plan  Acute metabolic encephalopathy secondary to overdose  She is very agitated requiring wrist restraints and an IV precedex drip has been ordered for the ICU  Urine tox screen ordered  She may require IV ativan which would not prolong QT    SIRS (systemic inflammatory response syndrome) (HCC)- (present on admission)  Assessment & Plan  SIRS criteria identified on my evaluation include:  Tachycardia, with heart rate greater than 90 BPM  SIRS is  non-infectious, the patient does not have sepsis  WBC is 19 and she has tachycardia      QT prolongation- (present on admission)  Assessment & Plan  QTC is prolonged at 492  Recheck EKG at 20:00 and again in the morning given Quetiapine overdose    Hypokalemia- (present on admission)  Assessment & Plan  K low at 3.1  IV fluids with potassium supplementation ordered  Magnesium level ordered  Continuous telemetry monitoring to eval for arrhythmias.

## 2021-01-27 LAB
ALBUMIN SERPL BCP-MCNC: 4.2 G/DL (ref 3.2–4.9)
ALBUMIN/GLOB SERPL: 1.6 G/DL
ALP SERPL-CCNC: 95 U/L (ref 30–99)
ALT SERPL-CCNC: 16 U/L (ref 2–50)
ANION GAP SERPL CALC-SCNC: 9 MMOL/L (ref 7–16)
AST SERPL-CCNC: 40 U/L (ref 12–45)
BASOPHILS # BLD AUTO: 0.2 % (ref 0–1.8)
BASOPHILS # BLD: 0.02 K/UL (ref 0–0.12)
BILIRUB SERPL-MCNC: 0.4 MG/DL (ref 0.1–1.5)
BUN SERPL-MCNC: 5 MG/DL (ref 8–22)
CALCIUM SERPL-MCNC: 9.3 MG/DL (ref 8.5–10.5)
CHLORIDE SERPL-SCNC: 104 MMOL/L (ref 96–112)
CO2 SERPL-SCNC: 24 MMOL/L (ref 20–33)
CREAT SERPL-MCNC: 0.71 MG/DL (ref 0.5–1.4)
EKG IMPRESSION: NORMAL
EOSINOPHIL # BLD AUTO: 0 K/UL (ref 0–0.51)
EOSINOPHIL NFR BLD: 0 % (ref 0–6.9)
ERYTHROCYTE [DISTWIDTH] IN BLOOD BY AUTOMATED COUNT: 44.9 FL (ref 35.9–50)
GLOBULIN SER CALC-MCNC: 2.7 G/DL (ref 1.9–3.5)
GLUCOSE SERPL-MCNC: 87 MG/DL (ref 65–99)
HCT VFR BLD AUTO: 29.1 % (ref 37–47)
HGB BLD-MCNC: 9.3 G/DL (ref 12–16)
IMM GRANULOCYTES # BLD AUTO: 0.03 K/UL (ref 0–0.11)
IMM GRANULOCYTES NFR BLD AUTO: 0.3 % (ref 0–0.9)
LYMPHOCYTES # BLD AUTO: 1.69 K/UL (ref 1–4.8)
LYMPHOCYTES NFR BLD: 17.8 % (ref 22–41)
MAGNESIUM SERPL-MCNC: 2 MG/DL (ref 1.5–2.5)
MCH RBC QN AUTO: 30.3 PG (ref 27–33)
MCHC RBC AUTO-ENTMCNC: 32.5 G/DL (ref 33.6–35)
MCV RBC AUTO: 93.2 FL (ref 81.4–97.8)
MONOCYTES # BLD AUTO: 0.38 K/UL (ref 0–0.85)
MONOCYTES NFR BLD AUTO: 4 % (ref 0–13.4)
NEUTROPHILS # BLD AUTO: 7.36 K/UL (ref 2–7.15)
NEUTROPHILS NFR BLD: 77.7 % (ref 44–72)
NRBC # BLD AUTO: 0 K/UL
NRBC BLD-RTO: 0 /100 WBC
PHOSPHATE SERPL-MCNC: 3 MG/DL (ref 2.5–4.5)
PLATELET # BLD AUTO: 183 K/UL (ref 164–446)
PMV BLD AUTO: 10.3 FL (ref 9–12.9)
POTASSIUM SERPL-SCNC: 4.1 MMOL/L (ref 3.6–5.5)
PROT SERPL-MCNC: 6.9 G/DL (ref 6–8.2)
RBC # BLD AUTO: 3.07 M/UL (ref 4.2–5.4)
SODIUM SERPL-SCNC: 137 MMOL/L (ref 135–145)
WBC # BLD AUTO: 9.5 K/UL (ref 4.8–10.8)

## 2021-01-27 PROCEDURE — 80053 COMPREHEN METABOLIC PANEL: CPT

## 2021-01-27 PROCEDURE — 700102 HCHG RX REV CODE 250 W/ 637 OVERRIDE(OP): Performed by: STUDENT IN AN ORGANIZED HEALTH CARE EDUCATION/TRAINING PROGRAM

## 2021-01-27 PROCEDURE — 700102 HCHG RX REV CODE 250 W/ 637 OVERRIDE(OP): Performed by: HOSPITALIST

## 2021-01-27 PROCEDURE — 93005 ELECTROCARDIOGRAM TRACING: CPT | Performed by: HOSPITALIST

## 2021-01-27 PROCEDURE — 83735 ASSAY OF MAGNESIUM: CPT

## 2021-01-27 PROCEDURE — 700105 HCHG RX REV CODE 258: Performed by: HOSPITALIST

## 2021-01-27 PROCEDURE — 84100 ASSAY OF PHOSPHORUS: CPT

## 2021-01-27 PROCEDURE — A9270 NON-COVERED ITEM OR SERVICE: HCPCS | Performed by: STUDENT IN AN ORGANIZED HEALTH CARE EDUCATION/TRAINING PROGRAM

## 2021-01-27 PROCEDURE — A9270 NON-COVERED ITEM OR SERVICE: HCPCS | Performed by: HOSPITALIST

## 2021-01-27 PROCEDURE — 85025 COMPLETE CBC W/AUTO DIFF WBC: CPT

## 2021-01-27 PROCEDURE — 99232 SBSQ HOSP IP/OBS MODERATE 35: CPT | Performed by: HOSPITALIST

## 2021-01-27 PROCEDURE — 700111 HCHG RX REV CODE 636 W/ 250 OVERRIDE (IP): Performed by: HOSPITALIST

## 2021-01-27 PROCEDURE — 93010 ELECTROCARDIOGRAM REPORT: CPT | Performed by: INTERNAL MEDICINE

## 2021-01-27 PROCEDURE — 770001 HCHG ROOM/CARE - MED/SURG/GYN PRIV*

## 2021-01-27 RX ADMIN — POTASSIUM CHLORIDE 40 MEQ: 1500 TABLET, EXTENDED RELEASE ORAL at 00:35

## 2021-01-27 RX ADMIN — DOCUSATE SODIUM 50 MG AND SENNOSIDES 8.6 MG 2 TABLET: 8.6; 5 TABLET, FILM COATED ORAL at 06:33

## 2021-01-27 RX ADMIN — POTASSIUM CHLORIDE: 149 INJECTION, SOLUTION, CONCENTRATE INTRAVENOUS at 06:35

## 2021-01-27 RX ADMIN — ENOXAPARIN SODIUM 40 MG: 40 INJECTION SUBCUTANEOUS at 06:33

## 2021-01-27 ASSESSMENT — ENCOUNTER SYMPTOMS
DIARRHEA: 0
FEVER: 0
DIZZINESS: 0
BLURRED VISION: 0
COUGH: 0
DOUBLE VISION: 0
VOMITING: 0
PALPITATIONS: 0
NAUSEA: 0
ABDOMINAL PAIN: 0
HEADACHES: 0
SORE THROAT: 0
DEPRESSION: 1
CHILLS: 0
LOSS OF CONSCIOUSNESS: 0
SHORTNESS OF BREATH: 0
BACK PAIN: 0

## 2021-01-27 ASSESSMENT — LIFESTYLE VARIABLES
TOTAL SCORE: 1
HOW MANY TIMES IN THE PAST YEAR HAVE YOU HAD 5 OR MORE DRINKS IN A DAY: 0
HAVE YOU EVER FELT YOU SHOULD CUT DOWN ON YOUR DRINKING: NO
ON A TYPICAL DAY WHEN YOU DRINK ALCOHOL HOW MANY DRINKS DO YOU HAVE: 1
DOES PATIENT WANT TO STOP DRINKING: NO
TOTAL SCORE: 1
CONSUMPTION TOTAL: NEGATIVE
ALCOHOL_USE: YES
AVERAGE NUMBER OF DAYS PER WEEK YOU HAVE A DRINK CONTAINING ALCOHOL: 1
EVER HAD A DRINK FIRST THING IN THE MORNING TO STEADY YOUR NERVES TO GET RID OF A HANGOVER: NO
EVER FELT BAD OR GUILTY ABOUT YOUR DRINKING: YES
HAVE PEOPLE ANNOYED YOU BY CRITICIZING YOUR DRINKING: NO
TOTAL SCORE: 1

## 2021-01-27 ASSESSMENT — PATIENT HEALTH QUESTIONNAIRE - PHQ9
4. FEELING TIRED OR HAVING LITTLE ENERGY: NEARLY EVERY DAY
SUM OF ALL RESPONSES TO PHQ9 QUESTIONS 1 AND 2: 6
9. THOUGHTS THAT YOU WOULD BE BETTER OFF DEAD, OR OF HURTING YOURSELF: NEARLY EVERY DAY
3. TROUBLE FALLING OR STAYING ASLEEP OR SLEEPING TOO MUCH: NEARLY EVERY DAY
8. MOVING OR SPEAKING SO SLOWLY THAT OTHER PEOPLE COULD HAVE NOTICED. OR THE OPPOSITE, BEING SO FIGETY OR RESTLESS THAT YOU HAVE BEEN MOVING AROUND A LOT MORE THAN USUAL: NOT AT ALL
2. FEELING DOWN, DEPRESSED, IRRITABLE, OR HOPELESS: NEARLY EVERY DAY
6. FEELING BAD ABOUT YOURSELF - OR THAT YOU ARE A FAILURE OR HAVE LET YOURSELF OR YOUR FAMILY DOWN: NEARLY EVERY DAY
5. POOR APPETITE OR OVEREATING: SEVERAL DAYS
7. TROUBLE CONCENTRATING ON THINGS, SUCH AS READING THE NEWSPAPER OR WATCHING TELEVISION: NEARLY EVERY DAY
SUM OF ALL RESPONSES TO PHQ QUESTIONS 1-9: 22
1. LITTLE INTEREST OR PLEASURE IN DOING THINGS: NEARLY EVERY DAY

## 2021-01-27 ASSESSMENT — FIBROSIS 4 INDEX: FIB4 SCORE: 0.48

## 2021-01-27 NOTE — CONSULTS
"PSYCHIATRIC INTAKE EVALUATION    *Reason for admission: Suicide attempt by overdose on Seroquel and Prozac.  Patient was brought in by EMS from home.  She had earlier text her friend informed me that she would overdose on medications.           *Reason for consult:  \" Overdose\"  *Requesting Physician/APN:Lazaro Graff M.D.        Legal Hold status: On hold    *Chief Complaint:: Unable to obtain    *HPI (includes Psychiatric ROS):   Unable to obtain      *Medical Review Of Symptoms (not dx conditions):   ROS unable to obtain    *Psychiatric Examination:   Vitals:   Vitals:    01/26/21 1412 01/26/21 1430 01/26/21 1500 01/26/21 1600   BP: 147/99 121/64  123/82   Pulse: (!) 112 (!) 105 (!) 118 (!) 111   Resp:  20 (!) 30 19   Temp:    36.5 °C (97.7 °F)   TempSrc:    Temporal   SpO2: 94% 97% 97% 98%   Weight:    72.6 kg (160 lb 0.9 oz)   Height:         Appearance: appears stated age, on four-point restraint, agitated, nonverbal, not following commands.  Noted to have superficial cuts on her both thights.  Has per nurse also on her breasts.  Did not require sutures  abnormal movements: Agitated  Gait/posture: Lying in bed in four-point restraint, at times she sits up agitated, unable to follow commands  Speech: Nonverbal  Though process: l unable to assess  Associations: Unable to assess  Thought content: Unable to assess  Judgement and Insight: Unable to assess  Orientation: Unable to assess  Recent and Remote Memory: Unable to assess  Attention Span and Concentration: Impaired  Language: Unable to assess  Fund of Knowledge: not tested   Mood and Affect: Unable to assess  SI/HI unable to assess              *PAST MEDICAL/PSYCH/FAMILY/SOCIAL(as reported by patient):       *medical hx:           Past Medical History:   Diagnosis Date   • ADD (attention deficit disorder)    • Anxiety    • ASTHMA    • Depression    • Psychiatric disorder     Depression     No past surgical history on file.     *psychiatric hx:    Unknown to " our services.  Per chart, brother reported patient has a history of depression, currently taking Seroquel and Prozac.  This appears to be her first suicide attempt.  She follows up with [facility].     *family Psych hx: Unable to obtain     *social hx: Per chart patient lives with her brother and her mother.  Alcohol: Unable to obtain, diagnostic alcohol not done  Drugs: Unable to obtain, UDS positive for benzos     *MEDICAL HX: labs, MARS, medications, etc were reviewed. Only those findings of potential interest to psychiatry are noted below:    *Current Medical issues:   see below     *Allergies:  Allergies   Allergen Reactions   • Cat Hair Extract       *Current Medications:    Current Facility-Administered Medications:   •  dexmedetomidine (PRECEDEX) 400 mcg/100mL NS premix infusion, 0.1-1.5 mcg/kg/hr, Intravenous, Continuous, Lazaro Graff M.D., Last Rate: 3.6 mL/hr at 01/26/21 1605, 0.2 mcg/kg/hr at 01/26/21 1605  •  potassium chloride 20 mEq in LR 1,000 mL infusion, , Intravenous, Continuous, Lazaro Graff M.D., Last Rate: 125 mL/hr at 01/26/21 1613, New Bag at 01/26/21 1613  •  senna-docusate (PERICOLACE or SENOKOT S) 8.6-50 MG per tablet 2 Tab, 2 Tab, Oral, BID, Stopped at 01/26/21 1800 **AND** polyethylene glycol/lytes (MIRALAX) PACKET 1 Packet, 1 Packet, Oral, QDAY PRN **AND** magnesium hydroxide (MILK OF MAGNESIA) suspension 30 mL, 30 mL, Oral, QDAY PRN **AND** bisacodyl (DULCOLAX) suppository 10 mg, 10 mg, Rectal, QDAY PRN, Lazaro Graff M.D.  •  [START ON 1/27/2021] enoxaparin (LOVENOX) inj 40 mg, 40 mg, Subcutaneous, DAILY, Lazaro Graff M.D.  •  MD Alert...ICU Electrolyte Replacement per Pharmacy, , Other, PHARMACY TO DOSE, Angel Luis Garcia M.D.  •  LORazepam (ATIVAN) injection 1-2 mg, 1-2 mg, Intravenous, Q HOUR PRN, Leodan Theodore M.D.  *ECG: personally reviewed QTc 475  *Cranial Imaging: Not done   EEG: Not done     *Labs:  Recent Results (from the past 72 hour(s))   EKG     Collection Time: 21  9:56 AM   Result Value Ref Range    Report       [facility]    Test Date:  2021  Pt Name:    MYCHAL EUGENE             Department: ER  MRN:        [id number]                      Room:        04  Gender:     Female                       Technician: [id number]  :        2000                   Requested By:ER TRIAGE PROTOCOL  Order #:    [id number]                    Reading MD:    Measurements  Intervals                                Axis  Rate:       105                          P:          62  GA:         160                          QRS:        31  QRSD:       86                           T:          9  QT:         372  QTc:        492    Interpretive Statements  SINUS TACHYCARDIA  BORDERLINE PROLONGED QT INTERVAL  No previous ECG available for comparison     CBC WITH DIFFERENTIAL    Collection Time: 21 10:03 AM   Result Value Ref Range    WBC 19.7 (H) 4.8 - 10.8 K/uL    RBC 4.34 4.20 - 5.40 M/uL    Hemoglobin 13.1 12.0 - 16.0 g/dL    Hematocrit 39.7 37.0 - 47.0 %    MCV 91.5 81.4 - 97.8 fL    MCH 30.2 27.0 - 33.0 pg    MCHC 33.0 (L) 33.6 - 35.0 g/dL    RDW 42.5 35.9 - 50.0 fL    Platelet Count 217 164 - 446 K/uL    MPV 10.6 9.0 - 12.9 fL    Neutrophils-Polys 89.30 (H) 44.00 - 72.00 %    Lymphocytes 5.80 (L) 22.00 - 41.00 %    Monocytes 3.60 0.00 - 13.40 %    Eosinophils 0.10 0.00 - 6.90 %    Basophils 0.30 0.00 - 1.80 %    Immature Granulocytes 0.90 0.00 - 0.90 %    Nucleated RBC 0.00 /100 WBC    Neutrophils (Absolute) 17.59 (H) 2.00 - 7.15 K/uL    Lymphs (Absolute) 1.14 1.00 - 4.80 K/uL    Monos (Absolute) 0.71 0.00 - 0.85 K/uL    Eos (Absolute) 0.01 0.00 - 0.51 K/uL    Baso (Absolute) 0.06 0.00 - 0.12 K/uL    Immature Granulocytes (abs) 0.17 (H) 0.00 - 0.11 K/uL    NRBC (Absolute) 0.00 K/uL   Comp Metabolic Panel    Collection Time: 21 10:03 AM   Result Value Ref Range    Sodium 137 135 - 145 mmol/L    Potassium 3.1 (L) 3.6 -  5.5 mmol/L    Chloride 101 96 - 112 mmol/L    Co2 17 (L) 20 - 33 mmol/L    Anion Gap 19.0 (H) 7.0 - 16.0    Glucose 182 (H) 65 - 99 mg/dL    Bun 10 8 - 22 mg/dL    Creatinine 0.95 0.50 - 1.40 mg/dL    Calcium 8.9 8.5 - 10.5 mg/dL    AST(SGOT) 19 12 - 45 U/L    ALT(SGPT) 12 2 - 50 U/L    Alkaline Phosphatase 88 30 - 99 U/L    Total Bilirubin 0.2 0.1 - 1.5 mg/dL    Albumin 4.3 3.2 - 4.9 g/dL    Total Protein 7.0 6.0 - 8.2 g/dL    Globulin 2.7 1.9 - 3.5 g/dL    A-G Ratio 1.6 g/dL   HCG QUAL SERUM    Collection Time: 01/26/21 10:03 AM   Result Value Ref Range    Beta-Hcg Qualitative Serum Negative Negative   ESTIMATED GFR    Collection Time: 01/26/21 10:03 AM   Result Value Ref Range    GFR If African American >60 >60 mL/min/1.73 m 2    GFR If Non African American >60 >60 mL/min/1.73 m 2   MAGNESIUM    Collection Time: 01/26/21 10:03 AM   Result Value Ref Range    Magnesium 1.8 1.5 - 2.5 mg/dL   SARS-CoV-2 PCR (24 hour In-House): Collect NP swab in VTM    Collection Time: 01/26/21 12:30 PM    Specimen: Respirate   Result Value Ref Range    SARS-CoV-2 Source NP Swab    ACETAMINOPHEN    Collection Time: 01/26/21 12:36 PM   Result Value Ref Range    Acetaminophen -Tylenol <5 (L) 10 - 30 ug/mL   Salicylate    Collection Time: 01/26/21 12:36 PM   Result Value Ref Range    Salicylates, Quant. <1 (L) 15 - 25 mg/dL   URINALYSIS (UA)    Collection Time: 01/26/21  3:45 PM    Specimen: Urine   Result Value Ref Range    Color Yellow     Character Cloudy (A)     Specific Gravity 1.022 <1.035    Ph 5.0 5.0 - 8.0    Glucose Negative Negative mg/dL    Ketones Trace (A) Negative mg/dL    Protein Negative Negative mg/dL    Bilirubin Negative Negative    Urobilinogen, Urine 0.2 Negative    Nitrite Negative Negative    Leukocyte Esterase Negative Negative    Occult Blood Negative Negative    Micro Urine Req Microscopic    URINE DRUG SCREEN    Collection Time: 01/26/21  3:45 PM   Result Value Ref Range    Amphetamines Urine Negative  Negative    Barbiturates Negative Negative    Benzodiazepines Positive (A) Negative    Cocaine Metabolite Negative Negative    Methadone Negative Negative    Opiates Negative Negative    Oxycodone Negative Negative    Phencyclidine -Pcp Negative Negative    Propoxyphene Negative Negative    Cannabinoid Metab Negative Negative   URINE MICROSCOPIC (W/UA)    Collection Time: 21  3:45 PM   Result Value Ref Range    WBC 0-2 /hpf    RBC 0-2 /hpf    Bacteria Negative None /hpf    Epithelial Cells Negative /hpf    Hyaline Cast 0-2 /lpf   EKG (NOW)    Collection Time: 21  4:16 PM   Result Value Ref Range    Report       [facility]    Test Date:  2021  Pt Name:    MYCHAL EUGENE             Department: ER  MRN:        [id number]                      Room:       [facility]  Gender:     Female                       Technician: PEP  :        2000                   Requested By:RUSTY BEAR  Order #:    [id number]                    Reading MD: Alonso Quiñonez MD    Measurements  Intervals                                Axis  Rate:       109                          P:          69  NE:         192                          QRS:        45  QRSD:       90                           T:          41  QT:         352  QTc:        475    Interpretive Statements  SINUS TACHYCARDIA  BASELINE WANDER IN LEAD(S) V6  Compared to ECG 2021 09:56:15  No significant changes  Electronically Signed On 2021 17:08:09 PST by Alonso Quiñonez MD         Recent Labs     21  1003   WBC 19.7*   RBC 4.34   HEMOGLOBIN 13.1   HEMATOCRIT 39.7   MCV 91.5   MCH 30.2   RDW 42.5   PLATELETCT 217   MPV 10.6   NEUTSPOLYS 89.30*   LYMPHOCYTES 5.80*   MONOCYTES 3.60   EOSINOPHILS 0.10   BASOPHILS 0.30     Lab Results   Component Value Date/Time    SODIUM 137 2021 10:03 AM    POTASSIUM 3.1 (L) 2021 10:03 AM    CHLORIDE 101 2021 10:03 AM    CO2 17 (L) 2021 10:03 AM    GLUCOSE 182 (H) 2021  10:03 AM    BUN 10 01/26/2021 10:03 AM    CREATININE 0.95 01/26/2021 10:03 AM         No results found for: BREATHALIZER  No components found for: BLOODALCOHOL   Lab Results   Component Value Date/Time    AMPHUR Negative 01/26/2021 1545    BARBSURINE Negative 01/26/2021 1545    BENZODIAZU Positive (A) 01/26/2021 1545    COCAINEMET Negative 01/26/2021 1545    METHADONE Negative 01/26/2021 1545    OPIATES Negative 01/26/2021 1545    OXYCODN Negative 01/26/2021 1545    PCPURINE Negative 01/26/2021 1545    PROPOXY Negative 01/26/2021 1545    CANNABINOID Negative 01/26/2021 1545     No results found for: TSH, FREET4     Assessment: Patient is currently encephalopathic, on four-point restraint, agitated and nonverbal.  Unable to participate in evaluation.  Will be extending legal hold at this time due to to suicide attempt by overdose on Seroquel and Prozac.      Dx:  Encephalopathy  History of depression    Medical (specify new and established dx):  Drug overdose  Bilateral wrist laceration, superficial wounds  SIRS  QT prolongation  Hypokalemia    Plan:  1- Legal hold: Extended  2- Psychotropic medications: Hold psychotropic medications at this time.  Monitor for SS and NMS  3- Please transfer pt to inpatient psychiatric hospital when medically cleared and bed is available  4- Psychiatry will follow up    Thank you for the consult.     Sitter: Yes  Visitors, phone and personal belongings: Per nurse's discretion

## 2021-01-27 NOTE — DISCHARGE PLANNING
Filed petition to the court via [employer]. Waiting on verified petition.    Received verified petition from the court. Sent copy to ALEXA Stone, scanned copy into .

## 2021-01-27 NOTE — PROGRESS NOTES
[facility] Medicine Daily Progress Note    Date of Service  1/27/2021    Chief Complaint  20 y.o. female admitted 1/26/2021 with intentional OD    Hospital Course  PMHx depression.  Pt texted  A friend about her intention to overdose who called EMS.  They found bottles of seroquel and fluoxetine: estimated she took 800mg and 350mg respectively.  Pt had some superficial lacerations to her wrist.  En route to ED pt was agitated and was given 4mg of versed.    Initial QTc 490    Interval Problem Update  Pt is alert and interactive this am.  She states she feels tired but otherwise has no complaints    Consultants/Specialty  Psychiatry    Code Status  Full Code    Disposition  TBD by Psychiatry after their evaluation    Review of Systems  Review of Systems   Constitutional: Negative for chills and fever.   HENT: Negative for nosebleeds and sore throat.    Eyes: Negative for blurred vision and double vision.   Respiratory: Negative for cough and shortness of breath.    Cardiovascular: Negative for chest pain, palpitations and leg swelling.   Gastrointestinal: Negative for abdominal pain, diarrhea, nausea and vomiting.   Genitourinary: Negative for dysuria and urgency.   Musculoskeletal: Negative for back pain.   Skin: Negative for rash.   Neurological: Negative for dizziness, loss of consciousness and headaches.   Psychiatric/Behavioral: Positive for depression and suicidal ideas.        Physical Exam  Temp:  [36.4 °C (97.6 °F)-37.1 °C (98.8 °F)] 36.6 °C (97.9 °F)  Pulse:  [] 76  Resp:  [13-30] 17  BP: ()/(54-85) 113/71  SpO2:  [96 %-100 %] 97 %    Physical Exam  Vitals signs reviewed.   Constitutional:       General: She is not in acute distress.     Appearance: Normal appearance. She is well-developed. She is not diaphoretic.   HENT:      Head: Normocephalic and atraumatic.   Neck:      Vascular: No JVD.   Cardiovascular:      Rate and Rhythm: Normal rate and regular rhythm.   Pulmonary:      Effort:  Pulmonary effort is normal. No respiratory distress.      Breath sounds: No stridor. No wheezing or rales.   Abdominal:      Palpations: Abdomen is soft.      Tenderness: There is no abdominal tenderness. There is no guarding or rebound.   Skin:     General: Skin is warm and dry.      Findings: No rash.   Neurological:      Mental Status: She is alert and oriented to person, place, and time.   Psychiatric:         Thought Content: Thought content normal.      Comments: When querried pt admits she did intend to hurt herself with the overdose         Fluids    Intake/Output Summary (Last 24 hours) at 1/27/2021 1450  Last data filed at 1/27/2021 1200  Gross per 24 hour   Intake 2393.23 ml   Output 3151 ml   Net -757.77 ml       Laboratory  Recent Labs     01/26/21  1003 01/27/21  0430   WBC 19.7* 9.5   RBC 4.34 3.07*   HEMOGLOBIN 13.1 9.3*   HEMATOCRIT 39.7 29.1*   MCV 91.5 93.2   MCH 30.2 30.3   MCHC 33.0* 32.5*   RDW 42.5 44.9   PLATELETCT 217 183   MPV 10.6 10.3     Recent Labs     01/26/21  1003 01/26/21 2025 01/27/21  0645   SODIUM 137  --  137   POTASSIUM 3.1* 3.2* 4.1   CHLORIDE 101  --  104   CO2 17*  --  24   GLUCOSE 182*  --  87   BUN 10  --  5*   CREATININE 0.95  --  0.71   CALCIUM 8.9  --  9.3                   Imaging  No orders to display        Assessment/Plan  * Intentional drug overdose (HCC)- (present on admission)  Assessment & Plan  Purposeful overdose on Quetiapine and Fluoxetine   Poison control consulted from the ER  Tyelenol and aspirin negative  Legal hold placed  Pt has medically weathered her OD and is now medically cleared for DC to home or inpt Psych facility per Psychiatry teams evaluation    Encephalopathy acute- (present on admission)  Assessment & Plan  Acute metabolic encephalopathy secondary to overdose  Now resolved    SIRS (systemic inflammatory response syndrome) (HCC)- (present on admission)  Assessment & Plan  SIRS criteria identified on my evaluation include:  Tachycardia, with  heart rate greater than 90 BPM  SIRS is non-infectious, the patient does not have sepsis  WBC is 19 and she has tachycardia      QT prolongation- (present on admission)  Assessment & Plan  Drug induced  resolved  QTc 492 on presentation now 430      Hypokalemia- (present on admission)  Assessment & Plan  K low at 3.1  Now in normal range s/p repletion         VTE prophylaxis: enoxaparin

## 2021-01-27 NOTE — PROGRESS NOTES
Pt lethargic and confused but following commands, non-combative. Dr. Paige notified, orders to D/C leather restraints and place non-behavioral bilateral wrist restraints.     Precedex turned off.    Updated Grecia about K-3.1 on admission, orders to re-check K at this time

## 2021-01-27 NOTE — DISCHARGE PLANNING
Hospital Care Management Discharge Planning       Anticipated Discharge Disposition:   · To be determined- likely inpatient psychiatric facility      Action:   · This RN CM faxed legal hold paperwork to [employer], at extension [phone]  · Fax receipt received  · Sheeba notified       Barriers to Discharge:   · Medical clearance      Plan:    · Hospital Care Management Team to continue to provide support services and assistance with discharge planning as needed.

## 2021-01-27 NOTE — ASSESSMENT & PLAN NOTE
Reported overdose of quetiapine and fluoxetine  ?  Stimulant ingestion  Follow-up UDS, CPK, follow renal function and hepatic function  Received crystalloid  Monitor closely in ICU, given judicious benzodiazepines and dexmedetomidine as needed  Follow QTC, telemetry monitoring  Maintaining airway well.  Will follow closely in ICU  We will need psychiatry evaluation when neurologic status improved

## 2021-01-27 NOTE — PROGRESS NOTES
Pulmonary and Critical Care Medicine Progress Note    She is out of restraints today.  She is calm and cooperative.  I reviewed her chart and discussed the case with her nurse.  She has no more critical care needs.    OK to transfer out of ICU.  [facility] will sign off.  Please call if you have any questions.      Angel Luis Garcia MD  Pulmonary and Critical Care Medicine

## 2021-01-27 NOTE — CONSULTS
Critical Care Consultation    Date of consult: 1/26/2021    Referring Physician  Lazaro Graff M.D.    Reason for Consultation  Drug overdose, altered mentation    History of Presenting Illness  20 y.o. female who presented 1/26/2021 with drug overdose and altered mental status.  She reportedly has a history of depression and takes fluoxetine and Seroquel.  Per EMS report, patient texted her friend saying that she was going to overdose on her medications and reportedly took 16 tablets (800 mg of Seroquel and 350 mg of fluoxetine.  When she was found in her room by her brother she had superficial wounds over both wrists and was altered.  She had one episode of emesis per EMS on the way in and there was no pill fragments.  She received benzodiazepine for marked agitation and altered mentation.  Poison control was contacted.  Given her marked agitation requiring sedation and restraints she is being admitted to intensive care unit.  Urine tox screen is currently pending.    Code Status  Full Code    Review of Systems  Review of Systems   Unable to perform ROS: Mental status change       Past Medical History   has a past medical history of ADD (attention deficit disorder), Anxiety, ASTHMA, Depression, and Psychiatric disorder.    Surgical History   has no past surgical history on file.    Family History  family history is not on file.    Social History   reports that she has never smoked. She has never used smokeless tobacco. She reports that she does not drink alcohol or use drugs.    Medications  Home Medications     Reviewed by Kyara Rees (Pharmacy Tech) on 01/26/21 at 1336  Med List Status: Complete   Medication Last Dose Status   albuterol 108 (90 Base) MCG/ACT Aero Soln inhalation aerosol NOT TAKING Active   FLUoxetine (PROZAC) 20 MG Cap UNK Active   lamoTRIgine (LAMICTAL) 100 MG Tab UNK Active   lisdexamfetamine (VYVANSE) 30 MG capsule UNK Active   quetiapine (SEROQUEL) 50 MG tablet UNK Active               Current Facility-Administered Medications   Medication Dose Route Frequency Provider Last Rate Last Admin   • dexmedetomidine (PRECEDEX) 400 mcg/100mL NS premix infusion  0.1-1.5 mcg/kg/hr Intravenous Continuous Lazaro Graff M.D. 3.6 mL/hr at 01/26/21 1605 0.2 mcg/kg/hr at 01/26/21 1605   • potassium chloride 20 mEq in LR 1,000 mL infusion   Intravenous Continuous Lazaro Graff M.D. 125 mL/hr at 01/26/21 1613 New Bag at 01/26/21 1613   • senna-docusate (PERICOLACE or SENOKOT S) 8.6-50 MG per tablet 2 Tab  2 Tab Oral BID Lazaro Graff M.D.   Stopped at 01/26/21 1800    And   • polyethylene glycol/lytes (MIRALAX) PACKET 1 Packet  1 Packet Oral QDAY PRN Lazaro Graff M.D.        And   • magnesium hydroxide (MILK OF MAGNESIA) suspension 30 mL  30 mL Oral QDAY PRN Lazaro Graff M.D.        And   • bisacodyl (DULCOLAX) suppository 10 mg  10 mg Rectal QDAY PRN Lazaro Graff M.D.       • [START ON 1/27/2021] enoxaparin (LOVENOX) inj 40 mg  40 mg Subcutaneous DAILY Lazaro Graff M.D.       • MD Alert...ICU Electrolyte Replacement per Pharmacy   Other PHARMACY TO DOSE Angel Luis Garcia M.D.       • LORazepam (ATIVAN) injection 1-2 mg  1-2 mg Intravenous Q HOUR PRN Leodan Theodore M.D.           Allergies  Allergies   Allergen Reactions   • Cat Hair Extract        Vital Signs last 24 hours  Temp:  [36.5 °C (97.7 °F)-36.6 °C (97.8 °F)] 36.5 °C (97.7 °F)  Pulse:  [] 111  Resp:  [15-30] 19  BP: (109-164)/() 123/82  SpO2:  [94 %-98 %] 98 %    Physical Exam  Physical Exam  Vitals signs and nursing note reviewed.   Constitutional:       General: She is in acute distress.      Comments: Agitated, not following   HENT:      Head: Normocephalic and atraumatic.      Mouth/Throat:      Mouth: Mucous membranes are moist.   Eyes:      Comments: Pupils are dilated, sluggishly reactive   Neck:      Musculoskeletal: Neck supple.   Cardiovascular:      Rate and Rhythm: Regular rhythm. Tachycardia present.       Pulses: Normal pulses.   Pulmonary:      Effort: No respiratory distress.   Abdominal:      Palpations: Abdomen is soft.      Tenderness: There is no abdominal tenderness.   Musculoskeletal:         General: No swelling or deformity.   Skin:     General: Skin is warm and dry.      Comments: Superficial wrist wounds dressed   Neurological:      Comments: Agitated moves all extremities symmetrically, not following any commands         Fluids    Intake/Output Summary (Last 24 hours) at 2021 1700  Last data filed at 2021 1600  Gross per 24 hour   Intake --   Output 500 ml   Net -500 ml       Laboratory  Recent Results (from the past 48 hour(s))   EKG    Collection Time: 21  9:56 AM   Result Value Ref Range    Report       [facility] Emergency Dept.    Test Date:  2021  Pt Name:    MYCHAL EUGENE             Department: ER  MRN:        [id number]                      Room:       [facility]  Gender:     Female                       Technician: [id number]  :        2000                   Requested By:ER TRIAGE PROTOCOL  Order #:    [id number]                    Reading MD:    Measurements  Intervals                                Axis  Rate:       105                          P:          62  MS:         160                          QRS:        31  QRSD:       86                           T:          9  QT:         372  QTc:        492    Interpretive Statements  SINUS TACHYCARDIA  BORDERLINE PROLONGED QT INTERVAL  No previous ECG available for comparison     CBC WITH DIFFERENTIAL    Collection Time: 21 10:03 AM   Result Value Ref Range    WBC 19.7 (H) 4.8 - 10.8 K/uL    RBC 4.34 4.20 - 5.40 M/uL    Hemoglobin 13.1 12.0 - 16.0 g/dL    Hematocrit 39.7 37.0 - 47.0 %    MCV 91.5 81.4 - 97.8 fL    MCH 30.2 27.0 - 33.0 pg    MCHC 33.0 (L) 33.6 - 35.0 g/dL    RDW 42.5 35.9 - 50.0 fL    Platelet Count 217 164 - 446 K/uL    MPV 10.6 9.0 - 12.9 fL    Neutrophils-Polys 89.30 (H) 44.00 -  72.00 %    Lymphocytes 5.80 (L) 22.00 - 41.00 %    Monocytes 3.60 0.00 - 13.40 %    Eosinophils 0.10 0.00 - 6.90 %    Basophils 0.30 0.00 - 1.80 %    Immature Granulocytes 0.90 0.00 - 0.90 %    Nucleated RBC 0.00 /100 WBC    Neutrophils (Absolute) 17.59 (H) 2.00 - 7.15 K/uL    Lymphs (Absolute) 1.14 1.00 - 4.80 K/uL    Monos (Absolute) 0.71 0.00 - 0.85 K/uL    Eos (Absolute) 0.01 0.00 - 0.51 K/uL    Baso (Absolute) 0.06 0.00 - 0.12 K/uL    Immature Granulocytes (abs) 0.17 (H) 0.00 - 0.11 K/uL    NRBC (Absolute) 0.00 K/uL   Comp Metabolic Panel    Collection Time: 01/26/21 10:03 AM   Result Value Ref Range    Sodium 137 135 - 145 mmol/L    Potassium 3.1 (L) 3.6 - 5.5 mmol/L    Chloride 101 96 - 112 mmol/L    Co2 17 (L) 20 - 33 mmol/L    Anion Gap 19.0 (H) 7.0 - 16.0    Glucose 182 (H) 65 - 99 mg/dL    Bun 10 8 - 22 mg/dL    Creatinine 0.95 0.50 - 1.40 mg/dL    Calcium 8.9 8.5 - 10.5 mg/dL    AST(SGOT) 19 12 - 45 U/L    ALT(SGPT) 12 2 - 50 U/L    Alkaline Phosphatase 88 30 - 99 U/L    Total Bilirubin 0.2 0.1 - 1.5 mg/dL    Albumin 4.3 3.2 - 4.9 g/dL    Total Protein 7.0 6.0 - 8.2 g/dL    Globulin 2.7 1.9 - 3.5 g/dL    A-G Ratio 1.6 g/dL   HCG QUAL SERUM    Collection Time: 01/26/21 10:03 AM   Result Value Ref Range    Beta-Hcg Qualitative Serum Negative Negative   ESTIMATED GFR    Collection Time: 01/26/21 10:03 AM   Result Value Ref Range    GFR If African American >60 >60 mL/min/1.73 m 2    GFR If Non African American >60 >60 mL/min/1.73 m 2   MAGNESIUM    Collection Time: 01/26/21 10:03 AM   Result Value Ref Range    Magnesium 1.8 1.5 - 2.5 mg/dL   SARS-CoV-2 PCR (24 hour In-House): Collect NP swab in VTM    Collection Time: 01/26/21 12:30 PM    Specimen: Respirate   Result Value Ref Range    SARS-CoV-2 Source NP Swab    ACETAMINOPHEN    Collection Time: 01/26/21 12:36 PM   Result Value Ref Range    Acetaminophen -Tylenol <5 (L) 10 - 30 ug/mL   Salicylate    Collection Time: 01/26/21 12:36 PM   Result Value Ref  Range    Salicylates, Quant. <1 (L) 15 - 25 mg/dL   URINALYSIS (UA)    Collection Time: 21  3:45 PM    Specimen: Urine   Result Value Ref Range    Color Yellow     Character Cloudy (A)     Specific Gravity 1.022 <1.035    Ph 5.0 5.0 - 8.0    Glucose Negative Negative mg/dL    Ketones Trace (A) Negative mg/dL    Protein Negative Negative mg/dL    Bilirubin Negative Negative    Urobilinogen, Urine 0.2 Negative    Nitrite Negative Negative    Leukocyte Esterase Negative Negative    Occult Blood Negative Negative    Micro Urine Req Microscopic    URINE DRUG SCREEN    Collection Time: 21  3:45 PM   Result Value Ref Range    Amphetamines Urine Negative Negative    Barbiturates Negative Negative    Benzodiazepines Positive (A) Negative    Cocaine Metabolite Negative Negative    Methadone Negative Negative    Opiates Negative Negative    Oxycodone Negative Negative    Phencyclidine -Pcp Negative Negative    Propoxyphene Negative Negative    Cannabinoid Metab Negative Negative   URINE MICROSCOPIC (W/UA)    Collection Time: 21  3:45 PM   Result Value Ref Range    WBC 0-2 /hpf    RBC 0-2 /hpf    Bacteria Negative None /hpf    Epithelial Cells Negative /hpf    Hyaline Cast 0-2 /lpf   EKG (NOW)    Collection Time: 21  4:16 PM   Result Value Ref Range    Report       [facility]    Test Date:  2021  Pt Name:    MYCHAL EUGENE             Department: ER  MRN:        [id number]                      Room:       R114  Gender:     Female                       Technician: PEP  :        2000                   Requested By:RUSTY BEAR  Order #:    [id number]                    Reading MD:    Measurements  Intervals                                Axis  Rate:       109                          P:          69  KY:         192                          QRS:        45  QRSD:       90                           T:          41  QT:         352  QTc:        475    Interpretive Statements  SINUS  TACHYCARDIA  BASELINE WANDER IN LEAD(S) V6  Compared to ECG 01/26/2021 09:56:15  No significant changes         Imaging  No orders to display       Assessment/Plan  * Intentional drug overdose (HCC)- (present on admission)  Assessment & Plan  Reported overdose of quetiapine and fluoxetine  ?  Stimulant ingestion  Follow-up UDS, CPK, follow renal function and hepatic function  Received crystalloid  Monitor closely in ICU, given judicious benzodiazepines and dexmedetomidine as needed  Follow QTC, telemetry monitoring  Maintaining airway well.  Will follow closely in ICU  We will need psychiatry evaluation when neurologic status improved    Hypokalemia- (present on admission)  Assessment & Plan  Replete      Discussed patient condition and risk of morbidity and/or mortality with Hospitalist and Pharmacy.    The patient remains critically ill.  Critical care time = 35 minutes in directly providing and coordinating critical care and extensive data review.  No time overlap and excludes procedures.

## 2021-01-28 VITALS
WEIGHT: 163.14 LBS | TEMPERATURE: 98.1 F | DIASTOLIC BLOOD PRESSURE: 80 MMHG | RESPIRATION RATE: 16 BRPM | BODY MASS INDEX: 28.91 KG/M2 | HEIGHT: 63 IN | OXYGEN SATURATION: 98 % | SYSTOLIC BLOOD PRESSURE: 116 MMHG | HEART RATE: 82 BPM

## 2021-01-28 LAB
ALBUMIN SERPL BCP-MCNC: 4.6 G/DL (ref 3.2–4.9)
ALBUMIN/GLOB SERPL: 1.4 G/DL
ALP SERPL-CCNC: 99 U/L (ref 30–99)
ALT SERPL-CCNC: 22 U/L (ref 2–50)
ANION GAP SERPL CALC-SCNC: 10 MMOL/L (ref 7–16)
AST SERPL-CCNC: 46 U/L (ref 12–45)
BASOPHILS # BLD AUTO: 0.3 % (ref 0–1.8)
BASOPHILS # BLD: 0.02 K/UL (ref 0–0.12)
BILIRUB SERPL-MCNC: 0.5 MG/DL (ref 0.1–1.5)
BUN SERPL-MCNC: 9 MG/DL (ref 8–22)
CALCIUM SERPL-MCNC: 9.8 MG/DL (ref 8.5–10.5)
CHLORIDE SERPL-SCNC: 102 MMOL/L (ref 96–112)
CO2 SERPL-SCNC: 24 MMOL/L (ref 20–33)
CREAT SERPL-MCNC: 0.8 MG/DL (ref 0.5–1.4)
EOSINOPHIL # BLD AUTO: 0.07 K/UL (ref 0–0.51)
EOSINOPHIL NFR BLD: 1.2 % (ref 0–6.9)
ERYTHROCYTE [DISTWIDTH] IN BLOOD BY AUTOMATED COUNT: 42.9 FL (ref 35.9–50)
GLOBULIN SER CALC-MCNC: 3.2 G/DL (ref 1.9–3.5)
GLUCOSE SERPL-MCNC: 82 MG/DL (ref 65–99)
HCT VFR BLD AUTO: 44 % (ref 37–47)
HGB BLD-MCNC: 14.5 G/DL (ref 12–16)
IMM GRANULOCYTES # BLD AUTO: 0.02 K/UL (ref 0–0.11)
IMM GRANULOCYTES NFR BLD AUTO: 0.3 % (ref 0–0.9)
LYMPHOCYTES # BLD AUTO: 2.14 K/UL (ref 1–4.8)
LYMPHOCYTES NFR BLD: 35.3 % (ref 22–41)
MAGNESIUM SERPL-MCNC: 2.1 MG/DL (ref 1.5–2.5)
MCH RBC QN AUTO: 29.9 PG (ref 27–33)
MCHC RBC AUTO-ENTMCNC: 33 G/DL (ref 33.6–35)
MCV RBC AUTO: 90.7 FL (ref 81.4–97.8)
MONOCYTES # BLD AUTO: 0.33 K/UL (ref 0–0.85)
MONOCYTES NFR BLD AUTO: 5.4 % (ref 0–13.4)
NEUTROPHILS # BLD AUTO: 3.48 K/UL (ref 2–7.15)
NEUTROPHILS NFR BLD: 57.5 % (ref 44–72)
NRBC # BLD AUTO: 0 K/UL
NRBC BLD-RTO: 0 /100 WBC
PHOSPHATE SERPL-MCNC: 3.1 MG/DL (ref 2.5–4.5)
PLATELET # BLD AUTO: 274 K/UL (ref 164–446)
PMV BLD AUTO: 10.1 FL (ref 9–12.9)
POTASSIUM SERPL-SCNC: 3.9 MMOL/L (ref 3.6–5.5)
PROT SERPL-MCNC: 7.8 G/DL (ref 6–8.2)
RBC # BLD AUTO: 4.85 M/UL (ref 4.2–5.4)
SODIUM SERPL-SCNC: 136 MMOL/L (ref 135–145)
WBC # BLD AUTO: 6.1 K/UL (ref 4.8–10.8)

## 2021-01-28 PROCEDURE — 80053 COMPREHEN METABOLIC PANEL: CPT

## 2021-01-28 PROCEDURE — 700111 HCHG RX REV CODE 636 W/ 250 OVERRIDE (IP): Performed by: HOSPITALIST

## 2021-01-28 PROCEDURE — 84100 ASSAY OF PHOSPHORUS: CPT

## 2021-01-28 PROCEDURE — 83735 ASSAY OF MAGNESIUM: CPT

## 2021-01-28 PROCEDURE — 99238 HOSP IP/OBS DSCHRG MGMT 30/<: CPT | Performed by: HOSPITALIST

## 2021-01-28 PROCEDURE — 85025 COMPLETE CBC W/AUTO DIFF WBC: CPT

## 2021-01-28 RX ADMIN — ENOXAPARIN SODIUM 40 MG: 40 INJECTION SUBCUTANEOUS at 06:14

## 2021-01-28 ASSESSMENT — ENCOUNTER SYMPTOMS
BLURRED VISION: 0
SHORTNESS OF BREATH: 0
NAUSEA: 0
FEVER: 0
DIZZINESS: 1
CHILLS: 0
DEPRESSION: 1
VOMITING: 0
COUGH: 0
HEADACHES: 0
DOUBLE VISION: 0

## 2021-01-28 ASSESSMENT — FIBROSIS 4 INDEX: FIB4 SCORE: 0.68

## 2021-01-28 NOTE — DISCHARGE SUMMARY
Internal Medicine Discharge Summary     Date of Admission: 01/26/2021  Date of Discharge: 01/28/2021  Service: Internal Medicine  Attending Physician: Damien Dumont D.O.  Senior Resident: Zaki Henley M.D.    Discharge Diagnosis:   Intentional drug overdose with antipsychotic and SSRI    Hospital problems:  Principal Problem:    Intentional drug overdose (HCC) POA: Yes  Active Problems:    Encephalopathy acute POA: Yes    Hypokalemia POA: Yes    QT prolongation POA: Yes    SIRS (systemic inflammatory response syndrome) (HCC) POA: Yes  Resolved Problems:    * No resolved hospital problems. *        Hospital Course:   Phylicia Christianson is a 20 year old female with a past medical history of depression, ADD, and anxiety who was admitted 1/26/2021 after an intentional overdose of seroquel and fluoxetine, approximately 800mg and 350mg respectively, as well as superficial wrist lacerations. She was extremely agitated and admitted to the intensive care unit for close monitoring and supportive treatment. All psychiatric medications were held. Psychiatry consultation was obtained and a legal hold was placed and extended, and subsequent inpatient psychiatric care was recommended. She improved rapidly and by 1/27/2021 was medically cleared for transfer to a psychiatric facility. She was then discharged in stable condition to [facility] on 1/28/2021 with recommended primary care and psychiatric follow-up per the outside facility.    Consultants:   Psychiatry - Dr. Gutierrez    Physical Exam on Day of Discharge:   Vitals:    01/27/21 1800 01/27/21 1900 01/28/21 0400 01/28/21 0800   BP: (!) 97/52 (!) 99/55 122/74 116/80   Pulse: 72 74 81 82   Resp: 15 16 20 16   Temp: 36.5 °C (97.7 °F) 36.7 °C (98.1 °F) 36.3 °C (97.3 °F) 36.7 °C (98.1 °F)   TempSrc: Temporal Temporal Temporal Temporal   SpO2: 96% 94% 99% 98%   Weight:   74 kg (163 lb 2.3 oz)    Height:         Weight/BMI: Body mass index is 28.9  kg/m².  Pulse Oximetry: 98 %, O2 Delivery Device: None - Room Air    General: No acute distress, appears comfortable  CVS: RRR no m/r/g  PULM: Clear to auscultation bilaterally  ABD: Non-tender, non-distended, +BS  EXTR: No peripheral edema  NEURO: Alert and oriented x4  PSYCH: Appropriate affect, conversational    Most Recent Labs:    Lab Results   Component Value Date/Time    WBC 6.1 01/28/2021 04:00 AM    RBC 4.85 01/28/2021 04:00 AM    HEMOGLOBIN 14.5 01/28/2021 04:00 AM    HEMATOCRIT 44.0 01/28/2021 04:00 AM    MCV 90.7 01/28/2021 04:00 AM    MCH 29.9 01/28/2021 04:00 AM    MCHC 33.0 (L) 01/28/2021 04:00 AM    MPV 10.1 01/28/2021 04:00 AM    NEUTSPOLYS 57.50 01/28/2021 04:00 AM    LYMPHOCYTES 35.30 01/28/2021 04:00 AM    MONOCYTES 5.40 01/28/2021 04:00 AM    EOSINOPHILS 1.20 01/28/2021 04:00 AM    BASOPHILS 0.30 01/28/2021 04:00 AM    HYPOCHROMIA 1+ 09/19/2009 12:45 PM      Lab Results   Component Value Date/Time    SODIUM 136 01/28/2021 04:00 AM    POTASSIUM 3.9 01/28/2021 04:00 AM    CHLORIDE 102 01/28/2021 04:00 AM    CO2 24 01/28/2021 04:00 AM    GLUCOSE 82 01/28/2021 04:00 AM    BUN 9 01/28/2021 04:00 AM    CREATININE 0.80 01/28/2021 04:00 AM      Lab Results   Component Value Date/Time    ALTSGPT 22 01/28/2021 04:00 AM    ASTSGOT 46 (H) 01/28/2021 04:00 AM    ALKPHOSPHAT 99 01/28/2021 04:00 AM    TBILIRUBIN 0.5 01/28/2021 04:00 AM    ALBUMIN 4.6 01/28/2021 04:00 AM    GLOBULIN 3.2 01/28/2021 04:00 AM     No results found for: PROTHROMBTM, INR     Procedures and Imaging:   None  No orders to display       Condition at Discharge:   Stable     Disposition:    Inpatient psychiatric facility    Discharge Medications:      Medication List      CONTINUE taking these medications      Instructions   albuterol 108 (90 Base) MCG/ACT Aers inhalation aerosol   Inhale 2 Puffs by mouth every 6 hours as needed for Shortness of Breath.  Dose: 2 Puff        STOP taking these medications    FLUoxetine 20 MG Caps  Commonly  known as: PROZAC     lamoTRIgine 100 MG Tabs  Commonly known as: LAMICTAL     SEROquel 50 MG tablet  Generic drug: QUEtiapine     Vyvanse 30 MG capsule  Generic drug: lisdexamfetamine              Instructions:   Follow-up with a primary care provider, if you do not have one, please feel free to establish with the [facility] Internal Medicine clinic  Follow-up Information          1. Myla Olivarez D.O.. Schedule an appointment as soon as possible for a visit in 1 week.      Specialty: Internal Medicine  Contact information  [address] [place] [id number] [phone]          The patient was instructed to return to the ER in the event of worsening symptoms. I have counseled the patient on the importance of compliance and the patient has agreed to proceed with all medical recommendations and follow up plan indicated above.   The patient understands that all medications come with benefits and risks. Risks may include permanent injury or death and these risks can be minimized with close reassessment and monitoring.        Follow-up:   Primary care, routine  Psychiatry per [facility] recommendations    Time Spent on Discharge: 43 minutes

## 2021-01-28 NOTE — DISCHARGE INSTRUCTIONS
Discharge Instructions    Discharged to other by medical transportation with escort. Discharged via wheelchair, hospital escort: Yes.  Special equipment needed: Not Applicable    Be sure to schedule a follow-up appointment with your primary care doctor or any specialists as instructed.     Discharge Plan:   Influenza Vaccine Indication: Patient Refuses    I understand that a diet low in cholesterol, fat, and sodium is recommended for good health. Unless I have been given specific instructions below for another diet, I accept this instruction as my diet prescription.   Other diet: regular    Special Instructions: None    · Is patient discharged on Warfarin / Coumadin?   No     Depression / Suicide Risk    As you are discharged from this [facility] facility, it is important to learn how to keep safe from harming yourself.    Recognize the warning signs:  · Abrupt changes in personality, positive or negative- including increase in energy   · Giving away possessions  · Change in eating patterns- significant weight changes-  positive or negative  · Change in sleeping patterns- unable to sleep or sleeping all the time   · Unwillingness or inability to communicate  · Depression  · Unusual sadness, discouragement and loneliness  · Talk of wanting to die  · Neglect of personal appearance   · Rebelliousness- reckless behavior  · Withdrawal from people/activities they love  · Confusion- inability to concentrate     If you or a loved one observes any of these behaviors or has concerns about self-harm, here's what you can do:  · Talk about it- your feelings and reasons for harming yourself  · Remove any means that you might use to hurt yourself (examples: pills, rope, extension cords, firearm)  · Get professional help from the community (Mental Health, Substance Abuse, psychological counseling)  · Do not be alone:Call your Safe Contact- someone whom you trust who will be there for you.  · Call your local CRISIS HOTLINE  656-7766 or 972-834-0474  · Call your local Children's Mobile Crisis Response Team [place] (002) 407-1029 or www.[employer].MyMoneyPlatform  · Call the toll free National Suicide Prevention Hotlines   · National Suicide Prevention Lifeline 181-727-UJNE (7953)  · National [employer] Line Network 800-SUICIDE (344-6955)

## 2021-01-28 NOTE — DISCHARGE PLANNING
Received Transport Form @ 1126  Spoke to Cely @ MEET    Transport is scheduled for 1/28 @1430 going to [facility].    Per Ariana at [facility], pt does not have a [employer] transport benefit listed under current address.  ALEXA Stone notified.

## 2021-01-28 NOTE — DISCHARGE PLANNING
Hospital Care Management Discharge Planning       Anticipated Discharge Disposition:   · Inpatient Psychiatric Facility      Action:   · This RN CM followed up with Dr. Ruiz and received update that Pt is medically cleared for inpatient psychiatric facility  · This RN CM completed legal hold medical clearance checklist and faxed legal hold packet to LEONELA Gage at extension [phone]  · Fax receipt received   · Merari updated        Barriers to Discharge:   · Acceptance to psychiatric facility   · Transport      Plan:   · F/U with [facility]  · Hospital Care Management Team to continue to provide support services and assistance with discharge planning as needed.     4795 Addendum: This RN CM requested Dr. Ruiz to complete the medical examination portion of the legal hold. MD states he will complete it shortly.     1003 Addendum: This RN CM re-faxed legal hold packet with updated medical clearance section to LEONELA Gage, at extension [phone]. Fax receipt received. Merari updated.

## 2021-01-28 NOTE — DISCHARGE PLANNING
Per ALEXA Stone, [facility] sent behavioral health referrals to [facility] [place], [facility] and [facility].  Medical Clearance packet scanned into pt's media.    6435  Agency/Facility Name: [facility]  Spoke To: Amber  Outcome: Pt accepted by Dr. Rasheed. OK to set transport at any time. ALEXA Stone notified.

## 2021-01-28 NOTE — PROGRESS NOTES
PIVs removed. Discharge instructions given. All questions answered. Patient d/c'ed with [facility] to [facility] at this time

## 2021-01-28 NOTE — PROGRESS NOTES
"[facility] Progress Note      Admit Date: 1/26/2021    Resident(s): Zaki Henley M.D.   Attending:  MAURILIO PHOENIX/ Dr. Ruiz    Patient ID:    Name:  Phylicia Christianson     YOB: 2000  Age:  20 y.o.  female   MRN:  [id number]    Hospital Course (carried forward and updated):  Phylicia Christianson is a 20 y.o. female with a past medical history of depression, ADD, and anxiety who was admitted 1/26/2021 with intentional overdose of seroquel and fluoxetine, approximately 800mg and 350mg respectively, as well as superficial wrist lacerations. She was extremely agitated and admitted to the intensive care unit for close monitoring and supportive treatment. Psychiatry consultation was obtained and a legal hold was placed and extended, and subsequent inpatient psychiatric care was recommended. She improved rapidly and by 1/27/2021 was medically cleared for transfer to a psychiatric facility.     Consultants:  Critical Care  Psychiatry     Interval Events:  No acute events overnight, no complaints this morning except for some mild dizziness which is improved since admission. She states that she feels \"less suicidal\" this morning and relates to me that she had some severe life and family stressors prior to her suicide attempt. I contacted Dr. Gutierrez who will try to see her before she is transferred. Encouraged ambulation and sitting in chair today.      Review of Systems   Constitutional: Negative for chills and fever.   HENT: Negative for hearing loss and tinnitus.    Eyes: Negative for blurred vision and double vision.   Respiratory: Negative for cough and shortness of breath.    Cardiovascular: Negative for chest pain and leg swelling.   Gastrointestinal: Negative for nausea and vomiting.   Genitourinary: Negative for dysuria and hematuria.   Skin: Negative for itching and rash.   Neurological: Positive for dizziness. Negative for headaches.   Psychiatric/Behavioral: Positive for depression and suicidal ideas. " "      PHYSICAL EXAM:  Vitals:    01/27/21 1800 01/27/21 1900 01/28/21 0400 01/28/21 0800   BP: (!) 97/52 (!) 99/55 122/74 116/80   Pulse: 72 74 81 82   Resp: 15 16 20 16   Temp: 36.5 °C (97.7 °F) 36.7 °C (98.1 °F) 36.3 °C (97.3 °F) 36.7 °C (98.1 °F)   TempSrc: Temporal Temporal Temporal Temporal   SpO2: 96% 94% 99% 98%   Weight:   74 kg (163 lb 2.3 oz)    Height:        Body mass index is 28.9 kg/m².  Latest Vitals:  /80   Pulse 82   Temp 36.7 °C (98.1 °F) (Temporal)   Resp 16   Ht 1.6 m (5' 3\")   Wt 74 kg (163 lb 2.3 oz)   SpO2 98%   BMI 28.90 kg/m²   O2 therapy: Pulse Oximetry: 98 %, O2 Delivery Device: None - Room Air  Vitals Range last 24h:  Temp:  [36.3 °C (97.3 °F)-36.7 °C (98.1 °F)] 36.7 °C (98.1 °F)  Pulse:  [] 82  Resp:  [15-21] 16  BP: ()/(52-80) 116/80  SpO2:  [94 %-99 %] 98 %  Date 01/28/21 0700 - 01/29/21 0659   Shift 3044-6589 3181-0455 6567-6159 24 Hour Total   INTAKE   Shift Total       OUTPUT   Urine         Number of Times Voided 1 x   1 x   Shift Total       NET            Intake/Output Summary (Last 24 hours) at 1/28/2021 1041  Last data filed at 1/28/2021 0600  Gross per 24 hour   Intake 300 ml   Output 850 ml   Net -550 ml        Physical Exam   Constitutional: She is oriented to person, place, and time and well-developed, well-nourished, and in no distress.   HENT:   Head: Normocephalic and atraumatic.   Eyes: Pupils are equal, round, and reactive to light. EOM are normal.   Cardiovascular: Normal rate and regular rhythm. Exam reveals no gallop.   No murmur heard.  Pulmonary/Chest: Effort normal and breath sounds normal. She has no wheezes. She has no rales.   Abdominal: Soft. She exhibits no distension. There is no abdominal tenderness.   Musculoskeletal:         General: No edema.   Neurological: She is alert and oriented to person, place, and time.   Psychiatric:   Appropriate affect, conversational, somewhat apologetic           Recent Labs     01/26/21  1003 " 01/26/21 2025 01/27/21  0645 01/28/21  0400   SODIUM 137  --  137 136   POTASSIUM 3.1* 3.2* 4.1 3.9   CHLORIDE 101  --  104 102   CO2 17*  --  24 24   BUN 10  --  5* 9   CREATININE 0.95  --  0.71 0.80   MAGNESIUM 1.8  --  2.0 2.1   PHOSPHORUS  --   --  3.0 3.1   CALCIUM 8.9  --  9.3 9.8     Recent Labs     01/26/21  1003 01/27/21  0645 01/28/21  0400   ALTSGPT 12 16 22   ASTSGOT 19 40 46*   ALKPHOSPHAT 88 95 99   TBILIRUBIN 0.2 0.4 0.5   GLUCOSE 182* 87 82     Recent Labs     01/26/21  1003 01/27/21  0430 01/28/21  0400   RBC 4.34 3.07* 4.85   HEMOGLOBIN 13.1 9.3* 14.5   HEMATOCRIT 39.7 29.1* 44.0   PLATELETCT 217 183 274     Recent Labs     01/26/21  1003 01/27/21  0430 01/27/21  0645 01/28/21  0400   WBC 19.7* 9.5  --  6.1   NEUTSPOLYS 89.30* 77.70*  --  57.50   LYMPHOCYTES 5.80* 17.80*  --  35.30   MONOCYTES 3.60 4.00  --  5.40   EOSINOPHILS 0.10 0.00  --  1.20   BASOPHILS 0.30 0.20  --  0.30   ASTSGOT 19  --  40 46*   ALTSGPT 12  --  16 22   ALKPHOSPHAT 88  --  95 99   TBILIRUBIN 0.2  --  0.4 0.5       Meds:  • senna-docusate  2 Tab      And   • polyethylene glycol/lytes  1 Packet      And   • magnesium hydroxide  30 mL      And   • bisacodyl  10 mg     • enoxaparin  40 mg     • LORazepam  1-2 mg     • prochlorperazine  10 mg          Procedures:  None    Imaging:  No orders to display       Problem and Plan:    * Intentional drug overdose (HCC)- (present on admission)  Assessment & Plan  Purposeful overdose on Quetiapine and Fluoxetine with contribution from recent life stressors  Legal hold placed and extended  Psychiatry onboard, will re-evaluate  Medically cleared for transfer to psychiatric facility  Continue 1:1 sitter  Hold psychiatric medications    Encephalopathy acute- (present on admission)  Assessment & Plan  Acute metabolic encephalopathy secondary to overdose  Now resolved    SIRS (systemic inflammatory response syndrome) (HCC)- (present on admission)  Assessment & Plan  SIRS criteria identified  on my evaluation include:  Tachycardia, with heart rate greater than 90 BPM  SIRS is non-infectious, the patient does not have sepsis  WBC is 19 and she has tachycardia  Resolved    QT prolongation- (present on admission)  Assessment & Plan  Drug induced  resolved  QTc 492 on presentation now 430      Hypokalemia- (present on admission)  Assessment & Plan  K low at 3.1  Now resolved        DISPO: Psychiatric facility    CODE STATUS: Full    Quality Measures:  Feeding: Regular diet  Analgesia: PRN  Sedation: N/A  Thromboprophylaxis: Lovenox  Head of bed: >30 degrees  Ulcer prophylaxis: N/A  Glycemic control: N/A  Bowel care: bowel regimen  Indwelling lines: PIV  Deescalation of antibiotics: N/a      Zaki Henley M.D.

## 2021-01-28 NOTE — DISCHARGE PLANNING
RN CM received update from [facility], that [facility] accepted the Pt.    RN CHANG notified Dr. Henley of acceptance. MD to place d/c summary and orders.     RN CM completed transport requests and faxed them to [facility], [facility], extension [phone]. Fax receipt received.    RN CHANG completed transfer packet including COBRA, original legal hold, facesheets, d/c summary, and transfer report. Awaiting confirmed transport time.    1219 Addendum: Patient updated on d/c plan. Pt agreeable, all questions answered by this RN CHANG.     3187 Addendum: This RN CM received update from [facility], that transport is scheduled for 1430. Pt and bedside RN updated. Transfer packet placed in Pt's hard chart.

## 2021-02-07 NOTE — DOCUMENTATION QUERY
Critical access hospital                                                                       Query Response Note      PATIENT:               MYCHAL EUGENE  ACCT #:                  [id number]  MRN:                     [id number]  :                      2000  ADMIT DATE:       2021 9:51 AM  DISCH DATE:        2021 3:20 PM  RESPONDING  PROVIDER #:        [id number]           QUERY TEXT:    Patient was admitted for a suicide attempt.  Depression has been documented in the Medical Record, however the  is unable to determine the specificity of this condition with current provider documentation.  Please clarify the type of depression present on this admission.     NOTE:  If an appropriate response is not listed below, please respond with a new note.    Silvina Byrd CCS  Coding   [email or web address]              The patient's Clinical Indicators include:  CLINICAL INDICATORS:  PER BEHAVIORAL HEALTH CONSULT:  General medical encounters noted til 2018; no psych hx or meds.  2018- depression noted in PMH and Celexa in home meds.  2019: home meds include buspar, seroquel, lamictal, ritalin.  PMH noted to also include ADD and Anxiety    psychiatric hx:    Unknown to our services.  Per chart, brother reported patient has a history of depression, currently taking Seroquel and Prozac.  This appears to be her first suicide attempt.  She follows up with [facility]    CLINICAL IMPRESSIONS:  Primary:  SA  Secondary:  Depression/Anxiety            PER H&P:  * Intentional drug overdose (HCC)- (present on admission)  Assessment & Plan  Purposeful overdose on Quetiapine and Fluoxetine     Past Medical History   has a past medical history of ADD (attention deficit disorder), Anxiety, ASTHMA, Depression, and Psychiatric disorder    TREATMENT:  CRYSTALLOID; LEGAL HOLD; ICU MONITORING    RELATED CONDITIONS:  hx of depression -  on home medications  Options provided:   -- MDD, recurrent, in full remission   -- MDD, single episode, in full remission   -- MDD, recurrent, in partial remission   -- MDD, single episode, in partial remission   -- MDD, mild, single episode   -- MDD, mild, recurrent, current episode   -- MDD, moderate, single episode   -- MDD, moderate, recurrent, current episode   -- MDD, severe, single episode, without psychotic features   -- MDD, severe, single episode, with psychotic features   -- MDD, severe, recurrent, current episode, without psychotic features   -- MDD, severe, recurrent, current episode, with psychotic features   -- Situational/Grief Reaction depression   -- Unable to determine      Query created by: Silvina Byrd on 2/3/2021 8:13 AM    RESPONSE TEXT:    Unable to determine          Electronically signed by:  ES CLEMENT DO 2/7/2021 8:02 AM

## 2021-04-21 ENCOUNTER — OFFICE VISIT (OUTPATIENT)
Dept: URGENT CARE | Facility: CLINIC | Age: 21
End: 2021-04-21
Payer: COMMERCIAL

## 2021-04-21 VITALS
RESPIRATION RATE: 16 BRPM | TEMPERATURE: 98.1 F | WEIGHT: 165 LBS | DIASTOLIC BLOOD PRESSURE: 62 MMHG | SYSTOLIC BLOOD PRESSURE: 110 MMHG | BODY MASS INDEX: 32.39 KG/M2 | HEART RATE: 75 BPM | HEIGHT: 60 IN | OXYGEN SATURATION: 97 %

## 2021-04-21 DIAGNOSIS — L08.9 LOCAL SKIN INFECTION: ICD-10-CM

## 2021-04-21 PROCEDURE — 99203 OFFICE O/P NEW LOW 30 MIN: CPT | Performed by: NURSE PRACTITIONER

## 2021-04-21 RX ORDER — DOXYCYCLINE HYCLATE 100 MG
100 TABLET ORAL 2 TIMES DAILY
Qty: 10 TABLET | Refills: 0 | Status: SHIPPED | OUTPATIENT
Start: 2021-04-21 | End: 2021-04-26

## 2021-04-21 ASSESSMENT — FIBROSIS 4 INDEX: FIB4 SCORE: 0.68

## 2021-04-21 NOTE — PROGRESS NOTES
Chief Complaint   Patient presents with   • Other       HISTORY OF PRESENT ILLNESS: Patient is a pleasant 20 y.o. female who presents to urgent care today with concerns of infection around her piercing.  She received a navel piercing less than 1 month ago.  For the past several days she has had redness around the area along with pussy drainage.  She denies any fever, chills, malaise.  She has tried basic wound care for symptom relief.        Patient Active Problem List    Diagnosis Date Noted   • Intentional drug overdose ([facility]) 01/26/2021   • Encephalopathy acute 01/26/2021   • Hypokalemia 01/26/2021   • QT prolongation 01/26/2021   • SIRS (systemic inflammatory response syndrome) ([facility]) 01/26/2021       Allergies:Cat hair extract    No current [facility]-ordered outpatient medications on file.     No current [facility]-ordered facility-administered medications on file.       Past Medical History:   Diagnosis Date   • ADD (attention deficit disorder)    • Anxiety    • ASTHMA    • Depression    • Psychiatric disorder     Depression       Social History     Tobacco Use   • Smoking status: Never Smoker   • Smokeless tobacco: Never Used   Substance Use Topics   • Alcohol use: No   • Drug use: No       No family status information on file.   History reviewed. No pertinent family history.    ROS:  Review of Systems   Constitutional: Negative for fever, chills, weight loss, malaise, and fatigue.   HENT: Negative for ear pain, nosebleeds, congestion, sore throat and neck pain.    Eyes: Negative for vision changes.   Neuro: Negative for headache, sensory changes, weakness, seizure, LOC.   Cardiovascular: Negative for chest pain, palpitations, orthopnea and leg swelling.   Respiratory: Negative for cough, sputum production, shortness of breath and wheezing.   Gastrointestinal: Negative for abdominal pain, nausea, vomiting or diarrhea.   Genitourinary: Negative for dysuria, urgency and frequency.  Musculoskeletal: Negative for falls, neck  pain, back pain, joint pain, myalgias.   Skin: Positive for pain, swelling, erythema, and drainage around piercing site.  Negative for rash, diaphoresis.     Exam:  /62   Pulse 75   Temp 36.7 °C (98.1 °F)   Resp 16   Ht 1.524 m (5')   Wt 74.8 kg (165 lb)   SpO2 97%   General: well-nourished, well-developed female in NAD  Head: normocephalic, atraumatic  Eyes: PERRLA, no conjunctival injection, acuity grossly intact, lids normal.  Ears: normal shape and symmetry, no tenderness, no discharge. External canals are without any significant edema or erythema. Tympanic membranes are without any inflammation, no effusion. Gross auditory acuity is intact.  Nose: symmetrical without tenderness, no discharge.  Mouth/Throat: reasonable hygiene, no erythema, exudates or tonsillar enlargement.  Neck: no masses, range of motion within normal limits, no tracheal deviation. No obvious thyroid enlargement.   Lymph: no cervical adenopathy. No supraclavicular adenopathy.   Neuro: alert and oriented. Cranial nerves 1-12 grossly intact. No sensory deficit.   Cardiovascular: regular rate and rhythm. No edema.  Pulmonary: no distress. Chest is symmetrical with respiration, no wheezes, crackles, or rhonchi.   Musculoskeletal: no clubbing, appropriate muscle tone, gait is stable.  Skin: warm, dry, intact, no clubbing, no cyanosis, no rashes.  There is a navel piercing in place, both puncture sites have been mild surrounding erythema and crusting, no active drainage seen, the piercing sites are tender.  No surrounding abdominal tenderness, swelling, or erythema.  Psych: appropriate mood, affect, judgement.         Assessment/Plan:  1. Local skin infection  doxycycline (VIBRAMYCIN) 100 MG Tab         Patient will be treated for suspected bacterial process, doxycycline as directed, basic wound care discussed.  Supportive care, differential diagnoses, and indications for immediate follow-up discussed with patient.   Pathogenesis of  diagnosis discussed including typical length and natural progression.   Instructed to return to clinic or nearest emergency department for any change in condition, further concerns, or worsening of symptoms.  Patient states understanding of the plan of care and discharge instructions.  Instructed to make an appointment, for follow up, with her primary care provider.        Please note that this dictation was created using voice recognition software. I have made every reasonable attempt to correct obvious errors, but I expect that there are errors of grammar and possibly content that I did not discover before finalizing the note.      HALEY Saldivar.

## 2021-05-31 ENCOUNTER — HOSPITAL ENCOUNTER (EMERGENCY)
Facility: MEDICAL CENTER | Age: 21
End: 2021-05-31
Attending: EMERGENCY MEDICINE
Payer: COMMERCIAL

## 2021-05-31 VITALS
RESPIRATION RATE: 16 BRPM | SYSTOLIC BLOOD PRESSURE: 124 MMHG | HEART RATE: 72 BPM | BODY MASS INDEX: 32.89 KG/M2 | WEIGHT: 167.55 LBS | TEMPERATURE: 96.9 F | OXYGEN SATURATION: 96 % | DIASTOLIC BLOOD PRESSURE: 75 MMHG | HEIGHT: 60 IN

## 2021-05-31 DIAGNOSIS — Z76.89 RETURN TO WORK EVALUATION: ICD-10-CM

## 2021-05-31 PROCEDURE — 99281 EMR DPT VST MAYX REQ PHY/QHP: CPT

## 2021-05-31 ASSESSMENT — FIBROSIS 4 INDEX: FIB4 SCORE: 0.68

## 2021-05-31 NOTE — Clinical Note
Phylicia Christianson was seen and treated in our emergency department on 5/31/2021.  She may return to work on 05/31/2021.  From an emergency department physician and emergent standpoint, I feel the patient can return back to work without limitations.     If you have any questions or concerns, please don't hesitate to call.      Zaki Tenorio M.D.

## 2021-05-31 NOTE — ED PROVIDER NOTES
ED Provider Note    CHIEF COMPLAINT  Chief Complaint   Patient presents with   • Medical Clearance     pt needs a work note to deem that she is cleared to go back to work     Seen at 10:25 AM    HPI  Phylicia Christianson is a 20 y.o. female who presents for note to go back to work.  The patient was admitted for SI with a suicide attempt in January.  She has been doing well.  She is seeing a psychiatrist, she is compliant with her Risperdal.  She feels happy in general, denies any SI, HI or AVH.  She works for [employer], she went back to work but lost her badge.  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  Apparently they did not want a note from a psychiatrist, perhaps as they do not identify a difference in psychiatrist and psychologist.  In any case she presents here to get a note to return to work.  She is eager to go back to work, she states that she needs the funds for this.  She denies any acute psychiatric issues at this time.    REVIEW OF SYSTEMS  See HPI  PAST MEDICAL HISTORY   has a past medical history of ADD (attention deficit disorder), Anxiety, ASTHMA, Depression, and Psychiatric disorder.    SOCIAL HISTORY  Social History     Tobacco Use   • Smoking status: Never Smoker   • Smokeless tobacco: Never Used   Substance and Sexual Activity   • Alcohol use: No   • Drug use: No   • Sexual activity: Not on file       SURGICAL HISTORY  patient denies any surgical history    CURRENT MEDICATIONS  Reviewed.  See Encounter Summary.     ALLERGIES  Allergies   Allergen Reactions   • Cat Hair Extract        PHYSICAL EXAM  VITAL SIGNS: /75   Pulse 72   Temp 36.1 °C (96.9 °F) (Temporal)   Resp 16   Ht 1.524 m (5')   Wt 76 kg (167 lb 8.8 oz)   LMP 05/30/2021   SpO2 96%   BMI 32.72 kg/m²   Constitutional: Awake, alert in no apparent distress.  HENT: Normocephalic, Bilateral external ears normal. Nose normal.   Eyes: Conjunctiva  normal, non-icteric, EOMI.    Thorax & Lungs: Easy unlabored respirations  Cardiovascular:    Abdomen:  No distention  Skin: Visualized skin is  Dry, No erythema, No rash.   Extremities:   atraumatic   Neurologic: Alert, Grossly non-focal.   Psychiatric: Affect and Mood normal, pleasant, laughs appropriately, goal-directed, euthymic.    RADIOLOGY  No orders to display       Nursing notes and vital signs were reviewed. (See chart for details)    Decision Making:  This is a pleasant 20 y.o. year old female who presents for a note to return to work.  On my examination of the patient I see a very pleasant, goal-directed interactive individual.  I wrote on my note for work that from an emergency department standpoint I have no issues with the patient returning back to work.  Ideally this note should have come from the patient's psychiatrist but if [facility] does not want to honor a psychiatry note then that is their business.    DISPOSITION:  Patient will be discharged home in good condition.    Discharge Medications:  Discharge Medication List as of 5/31/2021 10:45 AM          The patient was discharged home (see d/c instructions) and told to return immediately for any signs or symptoms listed, or any worsening at all.  The patient verbally agreed to the discharge precautions and follow-up plan which is documented in EPIC.        FINAL IMPRESSION  1. Return to work evaluation

## 2021-05-31 NOTE — ED NOTES
Pt brought back to [address] from triage. Pt able to transfer self to bed, call light in reach. ERP at bedside.

## 2021-05-31 NOTE — ED TRIAGE NOTES
"Chief Complaint   Patient presents with   • Medical Clearance     pt needs a work note to deem that she is cleared to go back to work       Pt here to get medical clearance. Pt had failed attempt in January, pt sees a psychiatrist currently. Pt denies SI/HI. Pt states work would not accept a note from her psychiatrist and they wanted a note from a \"real doctor\". Educated pt on triage process.       /75   Pulse 72   Temp 36.1 °C (96.9 °F) (Temporal)   Resp 16   Ht 1.524 m (5')   Wt 76 kg (167 lb 8.8 oz)   LMP 05/30/2021   SpO2 96%   BMI 32.72 kg/m²     "

## 2021-06-11 ENCOUNTER — HOSPITAL ENCOUNTER (EMERGENCY)
Dept: HOSPITAL 8 - ED | Age: 21
Discharge: HOME | End: 2021-06-11
Payer: COMMERCIAL

## 2021-06-11 VITALS — WEIGHT: 174.83 LBS | BODY MASS INDEX: 34.32 KG/M2 | HEIGHT: 60 IN

## 2021-06-11 VITALS — SYSTOLIC BLOOD PRESSURE: 115 MMHG | DIASTOLIC BLOOD PRESSURE: 78 MMHG

## 2021-06-11 DIAGNOSIS — O03.9: Primary | ICD-10-CM

## 2021-06-11 LAB
ALBUMIN SERPL-MCNC: 3.4 G/DL (ref 3.4–5)
ANION GAP SERPL CALC-SCNC: 9 MMOL/L (ref 5–15)
BASOPHILS # BLD AUTO: 0 X10^3/UL (ref 0–0.3)
BASOPHILS NFR BLD AUTO: 1 % (ref 0–1)
CALCIUM SERPL-MCNC: 9.1 MG/DL (ref 8.5–10.1)
CHLORIDE SERPL-SCNC: 108 MMOL/L (ref 98–107)
CREAT SERPL-MCNC: 0.61 MG/DL (ref 0.55–1.02)
EOSINOPHIL # BLD AUTO: 0.1 X10^3/UL (ref 0–0.8)
EOSINOPHIL NFR BLD AUTO: 2 % (ref 1–7)
ERYTHROCYTE [DISTWIDTH] IN BLOOD BY AUTOMATED COUNT: 13.2 % (ref 9.6–15.2)
LYMPHOCYTES # BLD AUTO: 1.9 X10^3/UL (ref 1–6.1)
LYMPHOCYTES NFR BLD AUTO: 29 % (ref 22–44)
MCH RBC QN AUTO: 29.8 PG (ref 27–34.8)
MCHC RBC AUTO-ENTMCNC: 34.2 G/DL (ref 32.4–35.8)
MONOCYTES # BLD AUTO: 0.5 X10^3/UL (ref 0–1.4)
MONOCYTES NFR BLD AUTO: 8 % (ref 2–9)
NEUTROPHILS # BLD AUTO: 3.9 X10^3/UL (ref 1.8–8)
NEUTROPHILS NFR BLD AUTO: 61 % (ref 42–75)
PLATELET # BLD AUTO: 232 X10^3/UL (ref 130–400)
PMV BLD AUTO: 8.5 FL (ref 7.4–10.4)
RBC # BLD AUTO: 4.4 X10^6/UL (ref 3.82–5.3)

## 2021-06-11 PROCEDURE — 99284 EMERGENCY DEPT VISIT MOD MDM: CPT

## 2021-06-11 PROCEDURE — 85025 COMPLETE CBC W/AUTO DIFF WBC: CPT

## 2021-06-11 PROCEDURE — 36415 COLL VENOUS BLD VENIPUNCTURE: CPT

## 2021-06-11 PROCEDURE — 76801 OB US < 14 WKS SINGLE FETUS: CPT

## 2021-06-11 PROCEDURE — 82040 ASSAY OF SERUM ALBUMIN: CPT

## 2021-06-11 PROCEDURE — 86901 BLOOD TYPING SEROLOGIC RH(D): CPT

## 2021-06-11 PROCEDURE — 80048 BASIC METABOLIC PNL TOTAL CA: CPT

## 2021-06-11 PROCEDURE — 84702 CHORIONIC GONADOTROPIN TEST: CPT

## 2021-07-10 ENCOUNTER — HOSPITAL ENCOUNTER (OUTPATIENT)
Dept: LAB | Facility: MEDICAL CENTER | Age: 21
End: 2021-07-10
Attending: OBSTETRICS & GYNECOLOGY
Payer: COMMERCIAL

## 2021-07-10 ENCOUNTER — APPOINTMENT (OUTPATIENT)
Dept: LAB | Facility: MEDICAL CENTER | Age: 21
End: 2021-07-10
Payer: COMMERCIAL

## 2021-07-10 LAB — B-HCG SERPL-ACNC: 41.5 MIU/ML (ref 0–5)

## 2021-07-10 PROCEDURE — 36415 COLL VENOUS BLD VENIPUNCTURE: CPT

## 2021-07-10 PROCEDURE — 84702 CHORIONIC GONADOTROPIN TEST: CPT

## 2021-12-28 NOTE — ED NOTES
Patient medicated with motrin per ERP orders. Patient tolerated well. Patient vomiting, ERP notified. Orders received.      n/a

## 2022-03-12 ENCOUNTER — HOSPITAL ENCOUNTER (EMERGENCY)
Facility: MEDICAL CENTER | Age: 22
End: 2022-03-12
Attending: EMERGENCY MEDICINE
Payer: COMMERCIAL

## 2022-03-12 VITALS
BODY MASS INDEX: 32.98 KG/M2 | TEMPERATURE: 97 F | HEART RATE: 90 BPM | OXYGEN SATURATION: 97 % | WEIGHT: 167.99 LBS | RESPIRATION RATE: 18 BRPM | SYSTOLIC BLOOD PRESSURE: 135 MMHG | HEIGHT: 60 IN | DIASTOLIC BLOOD PRESSURE: 90 MMHG

## 2022-03-12 DIAGNOSIS — J45.21 MILD INTERMITTENT ASTHMA WITH ACUTE EXACERBATION: ICD-10-CM

## 2022-03-12 PROCEDURE — 99284 EMERGENCY DEPT VISIT MOD MDM: CPT

## 2022-03-12 PROCEDURE — 700102 HCHG RX REV CODE 250 W/ 637 OVERRIDE(OP): Performed by: EMERGENCY MEDICINE

## 2022-03-12 PROCEDURE — A9270 NON-COVERED ITEM OR SERVICE: HCPCS | Performed by: EMERGENCY MEDICINE

## 2022-03-12 PROCEDURE — 700111 HCHG RX REV CODE 636 W/ 250 OVERRIDE (IP): Performed by: EMERGENCY MEDICINE

## 2022-03-12 RX ORDER — PREDNISONE 50 MG/1
50 TABLET ORAL DAILY
Qty: 4 TABLET | Refills: 0 | Status: SHIPPED | OUTPATIENT
Start: 2022-03-12 | End: 2022-03-16

## 2022-03-12 RX ORDER — LEVALBUTEROL TARTRATE 45 UG/1
1-2 AEROSOL, METERED ORAL EVERY 4 HOURS PRN
Qty: 1 EACH | Refills: 1 | Status: SHIPPED | OUTPATIENT
Start: 2022-03-12

## 2022-03-12 RX ORDER — PREDNISONE 20 MG/1
60 TABLET ORAL ONCE
Status: COMPLETED | OUTPATIENT
Start: 2022-03-12 | End: 2022-03-12

## 2022-03-12 RX ORDER — ALBUTEROL SULFATE 90 UG/1
2 AEROSOL, METERED RESPIRATORY (INHALATION) ONCE
Status: COMPLETED | OUTPATIENT
Start: 2022-03-12 | End: 2022-03-12

## 2022-03-12 RX ADMIN — PREDNISONE 60 MG: 20 TABLET ORAL at 22:36

## 2022-03-12 RX ADMIN — ALBUTEROL SULFATE 2 PUFF: 90 AEROSOL, METERED RESPIRATORY (INHALATION) at 22:28

## 2022-03-12 ASSESSMENT — LIFESTYLE VARIABLES
DO YOU DRINK ALCOHOL: NO
CONSUMPTION TOTAL: INCOMPLETE
EVER FELT BAD OR GUILTY ABOUT YOUR DRINKING: NO
EVER HAD A DRINK FIRST THING IN THE MORNING TO STEADY YOUR NERVES TO GET RID OF A HANGOVER: NO
TOTAL SCORE: 0
TOTAL SCORE: 0
HAVE YOU EVER FELT YOU SHOULD CUT DOWN ON YOUR DRINKING: NO
TOTAL SCORE: 0
HAVE PEOPLE ANNOYED YOU BY CRITICIZING YOUR DRINKING: NO

## 2022-03-12 ASSESSMENT — FIBROSIS 4 INDEX: FIB4 SCORE: 0.72

## 2022-03-13 NOTE — ED TRIAGE NOTES
Chief Complaint   Patient presents with   • Anxiety   • Medication Refill     Pt presents to triage with the complaint of anxiety attack that started at work. Pt explains that anxiety often triggers her asthma and she has run out of her inhaler. Pt denies current SOB but fears for future attacks. Pt is obviously upset and crying. Says its because she is tired and embarrassed for being here. Denies SI/HI.    /92   Pulse 99   Temp 35.9 °C (96.7 °F) (Temporal)   Resp 20   Ht 1.524 m (5')   Wt 76.2 kg (167 lb 15.9 oz)   SpO2 97%   BMI 32.81 kg/m²

## 2022-03-13 NOTE — ED PROVIDER NOTES
ED Provider Note    Scribed for Evangelist Newell MD by Evangelist Newell M.D.. 3/13/2022, 1:33 AM.    Primary care provider: Pcp Pt States None  Means of arrival: Private  History obtained from: Patient and friend  History limited by: None    CHIEF COMPLAINT  Chief Complaint   Patient presents with   • Anxiety   • Medication Refill       HPI  Phylicia Christianson is a 21 y.o. female who presents to the Emergency Department for evaluation of dyspnea and anxiety.  She notes no clear trigger, relates feeling anxious and dyspneic with wheezing this evening at about 9 PM.  Feeling somewhat better at this time without intervention.  She notes her inhaler has been  for quite some time.  She has not a panic attack thankfully for several years.  No fever, no productive cough, she notes she does have seasonal allergic symptoms and her asthma is thought to have been allergic in etiology.    REVIEW OF SYSTEMS  Pertinent positives include cough, dyspnea, anxiety. Pertinent negatives include no suicidal ideation, no productive cough, no fever.  All other systems reviewed and negative.    PAST MEDICAL HISTORY   has a past medical history of ADD (attention deficit disorder), Anxiety, ASTHMA, Depression, and Psychiatric disorder.    SURGICAL HISTORY  patient denies any surgical history    SOCIAL HISTORY  Social History     Tobacco Use   • Smoking status: Never Smoker   • Smokeless tobacco: Never Used   Substance Use Topics   • Alcohol use: No   • Drug use: No      Social History     Substance and Sexual Activity   Drug Use No       FAMILY HISTORY  No history of asthma in the immediate family    CURRENT MEDICATIONS  Home Medications    **Home medications have not yet been reviewed for this encounter**         ALLERGIES  Allergies   Allergen Reactions   • Cat Hair Extract        PHYSICAL EXAM  VITAL SIGNS: /90   Pulse 90   Temp 36.1 °C (97 °F) (Temporal)   Resp 18   Ht 1.524 m (5')   Wt 76.2 kg (167 lb 15.9  oz)   SpO2 97%   BMI 32.81 kg/m²     General: Alert, no acute distress  Skin: Warm, dry, normal for ethnicity  Head: Normocephalic, atraumatic  Neck: Trachea midline, no tenderness  Eye: PERRL, normal conjunctiva  ENMT: Oral mucosa moist, no pharyngeal erythema or exudate  Cardiovascular: Regular rate and rhythm, No murmur, Normal peripheral perfusion  Respiratory: Lungs demonstrate faint wheeze on expiration throughout, respirations are non-labored, breath sounds are equal  Musculoskeletal: No swelling, no deformity  Neurological: Alert and oriented to person, place, time, and situation  Lymphatics: No lymphadenopathy  Psychiatric: Cooperative, mildly anxious, otherwise appropriate mood & affect        COURSE & MEDICAL DECISION MAKING  Pertinent Labs & Imaging studies reviewed. (See chart for details)    2140 - Patient seen and examined at bedside. Patient will be treated with prednisone.  The differential diagnoses include but are not limited to: Asthma exacerbation, anxiety    Patient Vitals for the past 24 hrs:   BP Temp Temp src Pulse Resp SpO2 Height Weight   03/12/22 2239 135/90 36.1 °C (97 °F) Temporal 90 18 97 % -- --   03/12/22 2138 -- -- -- -- -- -- 1.524 m (5') 76.2 kg (167 lb 15.9 oz)   03/12/22 2135 141/92 35.9 °C (96.7 °F) Temporal 99 20 97 % -- --         Decision Making:  This is a 21 y.o. year old female who presents with wheeze and anxiety.  History of asthma.  She relates what sounds like an asthma attack likely triggered her anxiety this evening.  She is somewhat anxious on my assessment but more calm from when she initially described.  She does have mild wheezing on expiration, thankfully no hypoxia no evidence of increased work of breathing.  No fever, no history of productive cough or febrile illness.  No indication for inpatient management or imaging of the chest at this time.  I will order for steroids given she notes albuterol often seems to exacerbate anxiety and think Xopenex is a good  idea as well and will write for Xopenex MDI.    The patient will return for new or worsening symptoms and is stable at the time of discharge.    Patient has had high blood pressure while in the emergency department, felt likely secondary to medical condition. Counseled patient to monitor blood pressure at home and follow up with primary care physician.    DISPOSITION:  Patient will be discharged home in stable condition.    FOLLOW UP:  Jonathan Douglas M.D.  [address] [facility] [address]  [phone]            OUTPATIENT MEDICATIONS:  Discharge Medication List as of 3/12/2022 10:41 PM      START taking these medications    Details   levalbuterol (XOPENEX HFA) 45 MCG/ACT inhaler Inhale 1-2 Puffs every four hours as needed for Shortness of Breath., Disp-1 Each, R-1, Normal      predniSONE (DELTASONE) 50 MG Tab Take 1 Tablet by mouth every day for 4 days., Disp-4 Tablet, R-0, Normal               FINAL IMPRESSION  1. Mild intermittent asthma with acute exacerbation          Evangelist PALAFOX M.D. (Loi), am scribing for, and in the presence of, Evangelist Newell MD.    Electronically signed by: Evangelist Newell M.D. (Loi), 3/13/2022    Evangelist PALAFOX MD personally performed the services described in this documentation, as scribed by Evangelist Newell M.D. in my presence, and it is both accurate and complete    The note accurately reflects work and decisions made by me.  Evangelist Newell M.D.  3/13/2022  1:37 AM

## 2022-03-13 NOTE — ED NOTES
Pt discharged home. Pt in possession of belongings. Pt provided discharge education and information pertaining to medications and follow up appointments. Pt received copy of discharge instructions and verbalized understanding. /90   Pulse 90   Temp 36.1 °C (97 °F) (Temporal)   Resp 18   Ht 1.524 m (5')   Wt 76.2 kg (167 lb 15.9 oz)   SpO2 97%   BMI 32.81 kg/m²

## 2024-01-21 ENCOUNTER — APPOINTMENT (OUTPATIENT)
Dept: RADIOLOGY | Facility: MEDICAL CENTER | Age: 24
End: 2024-01-21
Attending: EMERGENCY MEDICINE
Payer: COMMERCIAL

## 2024-01-21 ENCOUNTER — HOSPITAL ENCOUNTER (EMERGENCY)
Facility: MEDICAL CENTER | Age: 24
End: 2024-01-21
Attending: EMERGENCY MEDICINE
Payer: COMMERCIAL

## 2024-01-21 VITALS
RESPIRATION RATE: 16 BRPM | BODY MASS INDEX: 32.47 KG/M2 | HEART RATE: 71 BPM | OXYGEN SATURATION: 96 % | WEIGHT: 171.96 LBS | HEIGHT: 61 IN | TEMPERATURE: 97.3 F | DIASTOLIC BLOOD PRESSURE: 61 MMHG | SYSTOLIC BLOOD PRESSURE: 106 MMHG

## 2024-01-21 DIAGNOSIS — R10.9 ABDOMINAL PAIN, UNSPECIFIED ABDOMINAL LOCATION: ICD-10-CM

## 2024-01-21 DIAGNOSIS — R11.2 NAUSEA AND VOMITING, UNSPECIFIED VOMITING TYPE: ICD-10-CM

## 2024-01-21 LAB
ALBUMIN SERPL BCP-MCNC: 4.5 G/DL (ref 3.2–4.9)
ALBUMIN/GLOB SERPL: 1.6 G/DL
ALP SERPL-CCNC: 94 U/L (ref 30–99)
ALT SERPL-CCNC: 23 U/L (ref 2–50)
ANION GAP SERPL CALC-SCNC: 11 MMOL/L (ref 7–16)
AST SERPL-CCNC: 22 U/L (ref 12–45)
BASOPHILS # BLD AUTO: 0.4 % (ref 0–1.8)
BASOPHILS # BLD: 0.03 K/UL (ref 0–0.12)
BILIRUB SERPL-MCNC: 0.4 MG/DL (ref 0.1–1.5)
BUN SERPL-MCNC: 13 MG/DL (ref 8–22)
CALCIUM ALBUM COR SERPL-MCNC: 8.9 MG/DL (ref 8.5–10.5)
CALCIUM SERPL-MCNC: 9.3 MG/DL (ref 8.5–10.5)
CHLORIDE SERPL-SCNC: 105 MMOL/L (ref 96–112)
CO2 SERPL-SCNC: 21 MMOL/L (ref 20–33)
CREAT SERPL-MCNC: 0.58 MG/DL (ref 0.5–1.4)
EOSINOPHIL # BLD AUTO: 0.54 K/UL (ref 0–0.51)
EOSINOPHIL NFR BLD: 8 % (ref 0–6.9)
ERYTHROCYTE [DISTWIDTH] IN BLOOD BY AUTOMATED COUNT: 39.8 FL (ref 35.9–50)
GFR SERPLBLD CREATININE-BSD FMLA CKD-EPI: 131 ML/MIN/1.73 M 2
GLOBULIN SER CALC-MCNC: 2.8 G/DL (ref 1.9–3.5)
GLUCOSE SERPL-MCNC: 100 MG/DL (ref 65–99)
HCG SERPL QL: NEGATIVE
HCT VFR BLD AUTO: 44.6 % (ref 37–47)
HGB BLD-MCNC: 15.2 G/DL (ref 12–16)
IMM GRANULOCYTES # BLD AUTO: 0.01 K/UL (ref 0–0.11)
IMM GRANULOCYTES NFR BLD AUTO: 0.1 % (ref 0–0.9)
LIPASE SERPL-CCNC: 35 U/L (ref 11–82)
LYMPHOCYTES # BLD AUTO: 1.83 K/UL (ref 1–4.8)
LYMPHOCYTES NFR BLD: 27.3 % (ref 22–41)
MCH RBC QN AUTO: 29.5 PG (ref 27–33)
MCHC RBC AUTO-ENTMCNC: 34.1 G/DL (ref 32.2–35.5)
MCV RBC AUTO: 86.4 FL (ref 81.4–97.8)
MONOCYTES # BLD AUTO: 0.28 K/UL (ref 0–0.85)
MONOCYTES NFR BLD AUTO: 4.2 % (ref 0–13.4)
NEUTROPHILS # BLD AUTO: 4.02 K/UL (ref 1.82–7.42)
NEUTROPHILS NFR BLD: 60 % (ref 44–72)
NRBC # BLD AUTO: 0 K/UL
NRBC BLD-RTO: 0 /100 WBC (ref 0–0.2)
PLATELET # BLD AUTO: 241 K/UL (ref 164–446)
PMV BLD AUTO: 10.6 FL (ref 9–12.9)
POTASSIUM SERPL-SCNC: 4.1 MMOL/L (ref 3.6–5.5)
PROT SERPL-MCNC: 7.3 G/DL (ref 6–8.2)
RBC # BLD AUTO: 5.16 M/UL (ref 4.2–5.4)
SODIUM SERPL-SCNC: 137 MMOL/L (ref 135–145)
WBC # BLD AUTO: 6.7 K/UL (ref 4.8–10.8)

## 2024-01-21 PROCEDURE — 99284 EMERGENCY DEPT VISIT MOD MDM: CPT

## 2024-01-21 PROCEDURE — 700102 HCHG RX REV CODE 250 W/ 637 OVERRIDE(OP): Performed by: EMERGENCY MEDICINE

## 2024-01-21 PROCEDURE — A9270 NON-COVERED ITEM OR SERVICE: HCPCS | Performed by: EMERGENCY MEDICINE

## 2024-01-21 PROCEDURE — 36415 COLL VENOUS BLD VENIPUNCTURE: CPT

## 2024-01-21 PROCEDURE — 85025 COMPLETE CBC W/AUTO DIFF WBC: CPT

## 2024-01-21 PROCEDURE — 700111 HCHG RX REV CODE 636 W/ 250 OVERRIDE (IP): Mod: JZ,UD | Performed by: EMERGENCY MEDICINE

## 2024-01-21 PROCEDURE — 74177 CT ABD & PELVIS W/CONTRAST: CPT

## 2024-01-21 PROCEDURE — 96374 THER/PROPH/DIAG INJ IV PUSH: CPT | Mod: XU

## 2024-01-21 PROCEDURE — 80053 COMPREHEN METABOLIC PANEL: CPT

## 2024-01-21 PROCEDURE — 83690 ASSAY OF LIPASE: CPT

## 2024-01-21 PROCEDURE — 700117 HCHG RX CONTRAST REV CODE 255: Mod: UD | Performed by: EMERGENCY MEDICINE

## 2024-01-21 PROCEDURE — 84703 CHORIONIC GONADOTROPIN ASSAY: CPT

## 2024-01-21 RX ORDER — PROPRANOLOL HYDROCHLORIDE 10 MG/1
10 TABLET ORAL 3 TIMES DAILY
COMMUNITY

## 2024-01-21 RX ORDER — OMEPRAZOLE 20 MG/1
20 CAPSULE, DELAYED RELEASE ORAL DAILY
Qty: 30 CAPSULE | Refills: 0 | Status: SHIPPED | OUTPATIENT
Start: 2024-01-21

## 2024-01-21 RX ORDER — ONDANSETRON 4 MG/1
4 TABLET, ORALLY DISINTEGRATING ORAL EVERY 6 HOURS PRN
Qty: 10 TABLET | Refills: 0 | Status: SHIPPED | OUTPATIENT
Start: 2024-01-21

## 2024-01-21 RX ORDER — ESCITALOPRAM OXALATE 10 MG/1
5 TABLET ORAL DAILY
COMMUNITY

## 2024-01-21 RX ORDER — ONDANSETRON 2 MG/ML
4 INJECTION INTRAMUSCULAR; INTRAVENOUS ONCE
Status: COMPLETED | OUTPATIENT
Start: 2024-01-21 | End: 2024-01-21

## 2024-01-21 RX ADMIN — IOHEXOL 100 ML: 350 INJECTION, SOLUTION INTRAVENOUS at 12:55

## 2024-01-21 RX ADMIN — LIDOCAINE HYDROCHLORIDE 30 ML: 20 SOLUTION ORAL; TOPICAL at 12:46

## 2024-01-21 RX ADMIN — ONDANSETRON 4 MG: 2 INJECTION INTRAMUSCULAR; INTRAVENOUS at 12:46

## 2024-01-21 NOTE — ED TRIAGE NOTES
"Chief Complaint   Patient presents with    Abdominal Pain     Patient reports intermittent epigastric pain x 2 days. Patient reports nausea, but denies vomiting or diarrhea       22 yo female to triage for above complaint.   Protocol ordered.    Pt is alert and oriented, speaking in full sentences, follows commands and responds appropriately to questions.     Patient placed back in lobby and educated on triage process. Asked to inform RN of any changes.    /73   Pulse 76   Temp 37 °C (98.6 °F) (Temporal)   Resp 16   Ht 1.549 m (5' 1\")   Wt 78 kg (171 lb 15.3 oz)   SpO2 97%   BMI 32.49 kg/m²     "

## 2024-01-21 NOTE — ED NOTES
Pt and mother given d/c instructions, f/u info and aware of RX x 2 for  with verbal understanding.  VSS at discharge.  PIV d/c'd with tip intact.  Pt dressed independently. Pt ambulatory from the ED w/ steady gait.  All belongings in possession on discharge.  Pt and mom escorted to the lobby by RN.

## 2024-01-21 NOTE — ED PROVIDER NOTES
ED Provider Note    Scribed for Ariana Snow M.D. by Keyur Ly. 1/21/2024, 12:05 PM.    Primary care provider: Pcp Pt States None  Means of arrival: Walk-in  History obtained from: Patient  History limited by: None    CHIEF COMPLAINT  Chief Complaint   Patient presents with    Abdominal Pain     Patient reports intermittent epigastric pain x 2 days. Patient reports nausea, but denies vomiting or diarrhea       HPI/ROS  Phylicia Christianson is a 23 y.o. female who presents to the Emergency Department for intermittent epigastric abdominal pain onset 2 days. She reports the pain started in her epigastric region and traveled to her periumbilical region.  She also reports the pain started as dull and is now more sharp. Patient also experiences nausea. She denies any urinary symptoms, vomiting, or diarrhea. She also denies any past abdominal surgery.  Eating does not make her pain worse. Patient last ate this morning around 8 AM in order to take a dose of Advil for her pain. She has a past history of Asthma. She has no known drug allergies.    EXTERNAL RECORDS REVIEWED  None pertinent    LIMITATION TO HISTORY   Select: : None    OUTSIDE HISTORIAN(S):  None      PAST MEDICAL HISTORY   has a past medical history of ADD (attention deficit disorder), Anxiety, ASTHMA, Depression, and Psychiatric disorder.    SURGICAL HISTORY  patient denies any surgical history    SOCIAL HISTORY  Social History     Tobacco Use    Smoking status: Never    Smokeless tobacco: Never   Substance Use Topics    Alcohol use: No    Drug use: No      Social History     Substance and Sexual Activity   Drug Use No     FAMILY HISTORY  No family history noted.    CURRENT MEDICATIONS  Home Medications       Reviewed by Crystal Goldsmith R.N. (Registered Nurse) on 01/21/24 at 1133  Med List Status: Partial     Medication Last Dose Status   escitalopram (LEXAPRO) 10 MG Tab  Active   levalbuterol (XOPENEX HFA) 45 MCG/ACT inhaler  Active   propranolol  "(INDERAL) 10 MG Tab  Active   QUEtiapine Fumarate (SEROQUEL PO)  Active   TRAZODONE HCL PO  Active                    ALLERGIES  Allergies   Allergen Reactions    Cat Hair Extract        PHYSICAL EXAM  VITAL SIGNS: /73   Pulse 76   Temp 37 °C (98.6 °F) (Temporal)   Resp 16   Ht 1.549 m (5' 1\")   Wt 78 kg (171 lb 15.3 oz)   SpO2 97%   BMI 32.49 kg/m²   Vitals reviewed by myself.  Nursing note and vitals reviewed.  Constitutional: Well-developed and well-nourished. No acute distress.  HENT: Head is normocephalic and atraumatic.  Eyes: extra-ocular movements intact  Cardiovascular: regular rate and regular rhythm. No murmur heard.  Pulmonary/Chest: Breath sounds normal. No wheezes or rales.   Abdominal: Soft, No distention. No rebound or guarding, mild diffuse tenderness to deep palpation. No specific point tenderness on initial exam.  Negative Snow sign  Musculoskeletal: Extremities exhibit normal range of motion without edema or tenderness.   Neurological: Awake and alert  Skin: Skin is warm and dry. No rash.     DIAGNOSTIC STUDIES:    LABS  Labs Reviewed   CBC WITH DIFFERENTIAL - Abnormal; Notable for the following components:       Result Value    Eosinophils 8.00 (*)     Eos (Absolute) 0.54 (*)     All other components within normal limits   COMP METABOLIC PANEL - Abnormal; Notable for the following components:    Glucose 100 (*)     All other components within normal limits   LIPASE   HCG QUAL SERUM   ESTIMATED GFR   URINALYSIS,CULTURE IF INDICATED     All labs reviewed and independently interpreted by myself    RADIOLOGY    Final interpretation by radiology demonstrates:    CT-ABDOMEN-PELVIS WITH   Final Result      1.  There is no acute inflammatory process within the abdomen or pelvis.        The radiologist's interpretation of all radiological studies have been reviewed by me.    COURSE & MEDICAL DECISION MAKING    ED Observation Status? No; Patient does not meet criteria for ED Observation. "     INITIAL ASSESSMENT, ED COURSE AND PLAN    Patient is a 23-year-old female who presents for evaluation of epigastric abdominal pain.  Differential diagnosis includes gastritis, peptic ulcer disease, pancreatitis, gastroenteritis.  Diagnostic workup includes labs and CT of the abdomen.    Patient's initial vitals are within normal limits.  She is treated with Zofran and GI cocktail after which she feels greatly improved.  Labs returned are independently interpreted by myself to demonstrate:  -Electrolytes and renal function within normal limits  -LFTs are within normal limits and patient has no right upper quadrant tenderness making biliary pathology unlikely  -Lipase is within normal limits ruling out pancreatitis  -No leukocytosis making underlying bacterial pathology less likely  -Labs otherwise unremarkable    CT of the abdomen returns and demonstrates no acute intra-abdominal processes.  Therefore at this time patient is reassured and advised on management of possibly gastritis versus peptic ulcer disease.  I will start her on omeprazole and Zofran for management of symptoms at home and have her follow-up with PCP.  Patient does not have a primary care physician and therefore referrals placed by myself.  She is then given strict return precautions and discharged in stable condition.       REASSESSMENTS   12:10 PM - Patient presents to the ED with abdominal pain. Patient will be treated for her pain. Ordered for labs and imaging to evaluate her symptoms.     1:38 PM - Patient was reevaluated at bedside. Discussed radiology results with the patient and informed them of the negative CT findings. Patient had the opportunity to ask any questions. The plan for discharge was discussed with them and they were told to return for any new or worsening symptoms. She was also informed of the plans for follow up. Patient is understanding and agreeable to the plan for discharge.         DISPOSITION AND DISCUSSIONS  I have  discussed management of the patient with the following physicians and EMILY's:  None    Discussion of management with other hospitals or appropriate source(s): None     Escalation of care considered, and ultimately not performed:see above    Barriers to care at this time, including but not limited to: Patient does not have established PCP.     Decision tools and prescription drugs considered including, but not limited to: see above.    Please see review of records as noted above    The patient will return for new or worsening symptoms and is stable at the time of discharge.    The patient is referred to a primary physician for blood pressure management, diabetic screening, and for all other preventative health concerns.    DISPOSITION:  Patient will be discharged home in stable condition.    FOLLOW UP:  [facility]  [address] [facility] [address] [phone]        OUTPATIENT MEDICATIONS:  New Prescriptions    OMEPRAZOLE (PRILOSEC) 20 MG DELAYED-RELEASE CAPSULE    Take 1 Capsule by mouth every day.    ONDANSETRON (ZOFRAN ODT) 4 MG TABLET DISPERSIBLE    Take 1 Tablet by mouth every 6 hours as needed for Nausea/Vomiting.     FINAL IMPRESSION  1. Abdominal pain, unspecified abdominal location    2. Nausea and vomiting, unspecified vomiting type       I, Keyur Ly (Loi), am scribing for, and in the presence of, Ariana Snow M.D..    Electronically signed by: Keyur Ly (Loi), 1/21/2024    IAriana M.D. personally performed the services described in this documentation, as scribed by Keyur Ly in my presence, and it is both accurate and complete.    The note accurately reflects work and decisions made by me.  Ariana Snow M.D.  1/21/2024  4:53 PM

## 2024-01-31 ENCOUNTER — TELEPHONE (OUTPATIENT)
Dept: HEALTH INFORMATION MANAGEMENT | Facility: OTHER | Age: 24
End: 2024-01-31
Payer: COMMERCIAL

## 2024-07-08 ENCOUNTER — HOSPITAL ENCOUNTER (EMERGENCY)
Facility: MEDICAL CENTER | Age: 24
End: 2024-07-08
Attending: EMERGENCY MEDICINE
Payer: COMMERCIAL

## 2024-07-08 ENCOUNTER — APPOINTMENT (OUTPATIENT)
Dept: RADIOLOGY | Facility: MEDICAL CENTER | Age: 24
End: 2024-07-08
Attending: EMERGENCY MEDICINE
Payer: COMMERCIAL

## 2024-07-08 VITALS
TEMPERATURE: 97.4 F | HEIGHT: 60 IN | DIASTOLIC BLOOD PRESSURE: 74 MMHG | SYSTOLIC BLOOD PRESSURE: 122 MMHG | RESPIRATION RATE: 16 BRPM | BODY MASS INDEX: 33.76 KG/M2 | HEART RATE: 75 BPM | WEIGHT: 171.96 LBS | OXYGEN SATURATION: 98 %

## 2024-07-08 DIAGNOSIS — R22.1 NECK SWELLING: ICD-10-CM

## 2024-07-08 DIAGNOSIS — R68.84 JAW PAIN: ICD-10-CM

## 2024-07-08 PROCEDURE — 99283 EMERGENCY DEPT VISIT LOW MDM: CPT

## 2024-07-08 PROCEDURE — 70355 PANORAMIC X-RAY OF JAWS: CPT

## 2024-07-08 RX ORDER — AMOXICILLIN AND CLAVULANATE POTASSIUM 875; 125 MG/1; MG/1
1 TABLET, FILM COATED ORAL 2 TIMES DAILY
Qty: 14 TABLET | Refills: 0 | Status: ACTIVE | OUTPATIENT
Start: 2024-07-08

## 2024-07-08 ASSESSMENT — PAIN DESCRIPTION - PAIN TYPE: TYPE: ACUTE PAIN

## 2024-07-08 ASSESSMENT — FIBROSIS 4 INDEX: FIB4 SCORE: 0.42
